# Patient Record
Sex: FEMALE | Race: WHITE | Employment: PART TIME | URBAN - METROPOLITAN AREA
[De-identification: names, ages, dates, MRNs, and addresses within clinical notes are randomized per-mention and may not be internally consistent; named-entity substitution may affect disease eponyms.]

---

## 2017-05-31 ENCOUNTER — TELEPHONE (OUTPATIENT)
Dept: NEUROLOGY | Facility: CLINIC | Age: 52
End: 2017-05-31

## 2017-05-31 DIAGNOSIS — G40.209 PARTIAL SYMPTOMATIC EPILEPSY WITH COMPLEX PARTIAL SEIZURES, NOT INTRACTABLE, WITHOUT STATUS EPILEPTICUS (HCC): Primary | ICD-10-CM

## 2017-06-01 NOTE — TELEPHONE ENCOUNTER
Received fax from [employer]    Keppra 750mg tablet approved from 5/31/17 - 05/31/18    Case HR#[id number]

## 2017-06-08 ENCOUNTER — OFFICE VISIT (OUTPATIENT)
Dept: NEUROLOGY | Facility: CLINIC | Age: 52
End: 2017-06-08

## 2017-06-08 ENCOUNTER — APPOINTMENT (OUTPATIENT)
Dept: LAB | Age: 52
End: 2017-06-08
Attending: Other
Payer: COMMERCIAL

## 2017-06-08 VITALS
SYSTOLIC BLOOD PRESSURE: 126 MMHG | HEART RATE: 82 BPM | WEIGHT: 126 LBS | RESPIRATION RATE: 16 BRPM | HEIGHT: 61 IN | BODY MASS INDEX: 23.79 KG/M2 | DIASTOLIC BLOOD PRESSURE: 72 MMHG

## 2017-06-08 DIAGNOSIS — G40.209 PARTIAL SYMPTOMATIC EPILEPSY WITH COMPLEX PARTIAL SEIZURES, NOT INTRACTABLE, WITHOUT STATUS EPILEPTICUS (HCC): Primary | ICD-10-CM

## 2017-06-08 DIAGNOSIS — E04.1 THYROID NODULE: ICD-10-CM

## 2017-06-08 DIAGNOSIS — Q85.00 NEUROFIBROMATOSIS (HCC): ICD-10-CM

## 2017-06-08 DIAGNOSIS — G40.209 PARTIAL SYMPTOMATIC EPILEPSY WITH COMPLEX PARTIAL SEIZURES, NOT INTRACTABLE, WITHOUT STATUS EPILEPTICUS (HCC): ICD-10-CM

## 2017-06-08 PROCEDURE — 99213 OFFICE O/P EST LOW 20 MIN: CPT | Performed by: OTHER

## 2017-06-08 PROCEDURE — 36415 COLL VENOUS BLD VENIPUNCTURE: CPT | Performed by: OTHER

## 2017-06-08 PROCEDURE — 80177 DRUG SCRN QUAN LEVETIRACETAM: CPT | Performed by: OTHER

## 2017-06-08 NOTE — PATIENT INSTRUCTIONS
Refill policies:    • Allow 2-3 business days for refills; controlled substances may take longer.   • Contact your pharmacy at least 5 days prior to running out of medication and have them send an electronic request or submit request through the [facility] have a procedure or additional testing performed. [facility]) will contact your insurance carrier to obtain pre-certification or prior authorization.     Unfortunately, SHAKIR has seen an increase in denial of payment even though the p

## 2017-06-08 NOTE — PROGRESS NOTES
[facility] with [facility]  Jadyn Saravia  8/10/1965  Primary Care Provider:  Romina Mcbride MD, [place]    6/8/2017    45year old yo patient being seen for:  Neurofibromatosis and seizures    Patient W Differential W Platelet  Standing Status: Future  Standing Expiration Date: 9/0/3530  Comp Metabolic Panel (14)  Standing Status: Future  Standing Expiration Date: 6/8/2018  Lipid Panel  Standing Status: Future  Standing Expiration Date: 6/8/2018  Daina Trimble

## 2017-06-20 ENCOUNTER — APPOINTMENT (OUTPATIENT)
Dept: LAB | Facility: HOSPITAL | Age: 52
End: 2017-06-20
Attending: OTOLARYNGOLOGY
Payer: COMMERCIAL

## 2017-06-20 ENCOUNTER — HOSPITAL ENCOUNTER (OUTPATIENT)
Dept: ULTRASOUND IMAGING | Facility: HOSPITAL | Age: 52
Discharge: HOME | End: 2017-06-20
Attending: OTOLARYNGOLOGY
Payer: COMMERCIAL

## 2017-06-20 DIAGNOSIS — E07.9 THYROID DYSFUNCTION: ICD-10-CM

## 2017-06-20 PROCEDURE — 84443 ASSAY THYROID STIM HORMONE: CPT

## 2017-06-20 PROCEDURE — 36415 COLL VENOUS BLD VENIPUNCTURE: CPT

## 2017-06-20 PROCEDURE — 84439 ASSAY OF FREE THYROXINE: CPT

## 2017-06-20 PROCEDURE — 76536 US EXAM OF HEAD AND NECK: CPT | Performed by: OTOLARYNGOLOGY

## 2017-07-10 ENCOUNTER — PATIENT MESSAGE (OUTPATIENT)
Dept: NEUROLOGY | Facility: CLINIC | Age: 52
End: 2017-07-10

## 2017-07-10 DIAGNOSIS — G40.209 PARTIAL SYMPTOMATIC EPILEPSY WITH COMPLEX PARTIAL SEIZURES, NOT INTRACTABLE, WITHOUT STATUS EPILEPTICUS (HCC): ICD-10-CM

## 2017-07-10 RX ORDER — LEVETIRACETAM 750 MG/1
750 TABLET ORAL 2 TIMES DAILY
Qty: 180 TABLET | Refills: 2 | Status: SHIPPED | OUTPATIENT
Start: 2017-07-10 | End: 2018-04-10

## 2017-07-10 NOTE — TELEPHONE ENCOUNTER
From: Bo Whitley  To:  Alia Stoll MD  Sent: 7/10/2017 10:12 AM CDT  Subject: Prescription Question    Dr. Shweta Duran,  Since we are going to try the generic Keppra I would like to have the prescription ordered now rather than wait until I would

## 2017-07-10 NOTE — TELEPHONE ENCOUNTER
Medication: Keppra (will authorize generic)    Date of last refill: 10/13/16  [place]    Last office visit: 6/8/17  Due back to clinic per last office note:  1 year  Date next office visit scheduled:  Future Appointme

## 2017-07-21 ENCOUNTER — OFFICE VISIT (OUTPATIENT)
Dept: INTERNAL MEDICINE CLINIC | Facility: CLINIC | Age: 52
End: 2017-07-21

## 2017-07-21 VITALS
HEIGHT: 60 IN | WEIGHT: 127.81 LBS | BODY MASS INDEX: 25.09 KG/M2 | RESPIRATION RATE: 16 BRPM | SYSTOLIC BLOOD PRESSURE: 120 MMHG | HEART RATE: 94 BPM | OXYGEN SATURATION: 99 % | DIASTOLIC BLOOD PRESSURE: 76 MMHG | TEMPERATURE: 98 F

## 2017-07-21 DIAGNOSIS — Z00.00 ROUTINE GENERAL MEDICAL EXAMINATION AT A HEALTH CARE FACILITY: Primary | ICD-10-CM

## 2017-07-21 DIAGNOSIS — Z12.4 PAP SMEAR FOR CERVICAL CANCER SCREENING: ICD-10-CM

## 2017-07-21 DIAGNOSIS — Z12.11 COLON CANCER SCREENING: ICD-10-CM

## 2017-07-21 DIAGNOSIS — Z23 NEED FOR VACCINATION: ICD-10-CM

## 2017-07-21 DIAGNOSIS — Z12.31 ENCOUNTER FOR SCREENING MAMMOGRAM FOR BREAST CANCER: ICD-10-CM

## 2017-07-21 DIAGNOSIS — Z78.0 POSTMENOPAUSAL: ICD-10-CM

## 2017-07-21 PROBLEM — E04.2 MULTIPLE THYROID NODULES: Status: ACTIVE | Noted: 2017-07-21

## 2017-07-21 PROBLEM — D12.5 ADENOMATOUS POLYP OF SIGMOID COLON: Status: ACTIVE | Noted: 2017-07-21

## 2017-07-21 PROBLEM — M85.88 OSTEOPENIA OF SPINE: Status: ACTIVE | Noted: 2017-07-21

## 2017-07-21 PROCEDURE — 88175 CYTOPATH C/V AUTO FLUID REDO: CPT | Performed by: INTERNAL MEDICINE

## 2017-07-21 PROCEDURE — 99386 PREV VISIT NEW AGE 40-64: CPT | Performed by: INTERNAL MEDICINE

## 2017-07-21 PROCEDURE — 87624 HPV HI-RISK TYP POOLED RSLT: CPT | Performed by: INTERNAL MEDICINE

## 2017-07-21 NOTE — PROGRESS NOTES
HPI:   Erinn Bolden is a 45year old female who presents for a complete physical exam.has epilepsy and thyroid nodules    Wt Readings from Last 6 Encounters:  07/21/17 : 127 lb 12.8 oz  06/08/17 : 126 lb  06/16/16 : 120 lb  06/11/15 : 115 lb  06/19/14 : skin tags  EYES:denies blurred vision or double vision  HEENT: denies nasal congestion, sinus pain or ST  LUNGS: denies shortness of breath with exertion  CARDIOVASCULAR: denies chest pain on exertion  GI: denies abdominal pain,denies heartburn, change in 5.500 mIU/mL         ASSESSMENT AND PLAN:   Donell Hendrickson is a 45year old female who presents for a complete physical exam.  Pap and pelvic done. Order put in for mammogram and dexascan.   Appropriate lab testing ordered, instructions given for healthy li

## 2017-07-24 ENCOUNTER — TELEPHONE (OUTPATIENT)
Dept: INTERNAL MEDICINE CLINIC | Facility: CLINIC | Age: 52
End: 2017-07-24

## 2017-07-24 ENCOUNTER — LAB ENCOUNTER (OUTPATIENT)
Dept: LAB | Age: 52
End: 2017-07-24
Attending: INTERNAL MEDICINE
Payer: COMMERCIAL

## 2017-07-24 DIAGNOSIS — Z00.00 ROUTINE GENERAL MEDICAL EXAMINATION AT A HEALTH CARE FACILITY: ICD-10-CM

## 2017-07-24 LAB
ALBUMIN SERPL-MCNC: 4 G/DL (ref 3.5–4.8)
ALP LIVER SERPL-CCNC: 45 U/L (ref 41–108)
ALT SERPL-CCNC: 18 U/L (ref 14–54)
AST SERPL-CCNC: 17 U/L (ref 15–41)
BASOPHILS # BLD AUTO: 0.11 X10(3) UL (ref 0–0.1)
BASOPHILS NFR BLD AUTO: 1.4 %
BILIRUB SERPL-MCNC: 0.6 MG/DL (ref 0.1–2)
BUN BLD-MCNC: 14 MG/DL (ref 8–20)
CALCIUM BLD-MCNC: 9.1 MG/DL (ref 8.3–10.3)
CHLORIDE: 108 MMOL/L (ref 101–111)
CHOLEST SMN-MCNC: 208 MG/DL (ref ?–200)
CO2: 27 MMOL/L (ref 22–32)
CREAT BLD-MCNC: 0.9 MG/DL (ref 0.55–1.02)
EOSINOPHIL # BLD AUTO: 0.2 X10(3) UL (ref 0–0.3)
EOSINOPHIL NFR BLD AUTO: 2.5 %
ERYTHROCYTE [DISTWIDTH] IN BLOOD BY AUTOMATED COUNT: 12.7 % (ref 11.5–16)
GLUCOSE BLD-MCNC: 90 MG/DL (ref 70–99)
HCT VFR BLD AUTO: 46.9 % (ref 34–50)
HDLC SERPL-MCNC: 78 MG/DL (ref 45–?)
HDLC SERPL: 2.67 {RATIO} (ref ?–4.44)
HGB BLD-MCNC: 15.1 G/DL (ref 12–16)
IMMATURE GRANULOCYTE COUNT: 0.02 X10(3) UL (ref 0–1)
IMMATURE GRANULOCYTE RATIO %: 0.3 %
LDLC SERPL CALC-MCNC: 110 MG/DL (ref ?–130)
LDLC SERPL-MCNC: 20 MG/DL (ref 5–40)
LYMPHOCYTES # BLD AUTO: 2.08 X10(3) UL (ref 0.9–4)
LYMPHOCYTES NFR BLD AUTO: 26.2 %
M PROTEIN MFR SERPL ELPH: 8 G/DL (ref 6.1–8.3)
MCH RBC QN AUTO: 28.9 PG (ref 27–33.2)
MCHC RBC AUTO-ENTMCNC: 32.2 G/DL (ref 31–37)
MCV RBC AUTO: 89.8 FL (ref 81–100)
MONOCYTES # BLD AUTO: 0.87 X10(3) UL (ref 0.1–0.6)
MONOCYTES NFR BLD AUTO: 11 %
NEUTROPHIL ABS PRELIM: 4.65 X10 (3) UL (ref 1.3–6.7)
NEUTROPHILS # BLD AUTO: 4.65 X10(3) UL (ref 1.3–6.7)
NEUTROPHILS NFR BLD AUTO: 58.6 %
NONHDLC SERPL-MCNC: 130 MG/DL (ref ?–130)
PLATELET # BLD AUTO: 308 10(3)UL (ref 150–450)
POTASSIUM SERPL-SCNC: 4.8 MMOL/L (ref 3.6–5.1)
RBC # BLD AUTO: 5.22 X10(6)UL (ref 3.8–5.1)
RED CELL DISTRIBUTION WIDTH-SD: 41.5 FL (ref 35.1–46.3)
SODIUM SERPL-SCNC: 143 MMOL/L (ref 136–144)
TRIGLYCERIDES: 99 MG/DL (ref ?–150)
WBC # BLD AUTO: 7.9 X10(3) UL (ref 4–13)

## 2017-07-24 PROCEDURE — 80061 LIPID PANEL: CPT | Performed by: INTERNAL MEDICINE

## 2017-07-24 PROCEDURE — 85025 COMPLETE CBC W/AUTO DIFF WBC: CPT | Performed by: INTERNAL MEDICINE

## 2017-07-24 PROCEDURE — 80053 COMPREHEN METABOLIC PANEL: CPT | Performed by: INTERNAL MEDICINE

## 2017-07-24 PROCEDURE — 36415 COLL VENOUS BLD VENIPUNCTURE: CPT | Performed by: INTERNAL MEDICINE

## 2017-07-24 NOTE — TELEPHONE ENCOUNTER
Pt dropped off lab results in our office, ordered by previous pcp. Included  CBC from 7/26/16 with normal results; also Lipid panel from  8/16/14 with normal results. To consult folder.

## 2017-07-24 NOTE — TELEPHONE ENCOUNTER
Incoming (mail or fax): Fax  Received from:  Patient dropped off in office (from patients other mychart)  Documentation given to:  Placed in Dr. Rhonda Galvan test results folder in Triage.

## 2017-07-26 LAB — HPV I/H RISK 1 DNA SPEC QL NAA+PROBE: NEGATIVE

## 2017-07-27 PROCEDURE — 82306 VITAMIN D 25 HYDROXY: CPT | Performed by: INTERNAL MEDICINE

## 2017-07-27 PROCEDURE — 36415 COLL VENOUS BLD VENIPUNCTURE: CPT | Performed by: INTERNAL MEDICINE

## 2017-07-28 ENCOUNTER — HOSPITAL ENCOUNTER (OUTPATIENT)
Dept: MAMMOGRAPHY | Age: 52
Discharge: HOME | End: 2017-07-28
Attending: INTERNAL MEDICINE
Payer: COMMERCIAL

## 2017-07-28 ENCOUNTER — HOSPITAL ENCOUNTER (OUTPATIENT)
Dept: BONE DENSITY | Age: 52
Discharge: HOME | End: 2017-07-28
Attending: INTERNAL MEDICINE
Payer: COMMERCIAL

## 2017-07-28 DIAGNOSIS — Z12.31 ENCOUNTER FOR SCREENING MAMMOGRAM FOR BREAST CANCER: ICD-10-CM

## 2017-07-28 DIAGNOSIS — M81.0 OSTEOPOROSIS, UNSPECIFIED OSTEOPOROSIS TYPE, UNSPECIFIED PATHOLOGICAL FRACTURE PRESENCE: Primary | ICD-10-CM

## 2017-07-28 DIAGNOSIS — Z78.0 POSTMENOPAUSAL: ICD-10-CM

## 2017-07-28 PROBLEM — M85.88 OSTEOPENIA OF SPINE: Status: RESOLVED | Noted: 2017-07-21 | Resolved: 2017-07-28

## 2017-07-28 LAB — 25-HYDROXYVITAMIN D (TOTAL): 27.1 NG/ML (ref 30–100)

## 2017-07-28 PROCEDURE — 77080 DXA BONE DENSITY AXIAL: CPT | Performed by: INTERNAL MEDICINE

## 2017-07-28 PROCEDURE — 77067 SCR MAMMO BI INCL CAD: CPT | Performed by: INTERNAL MEDICINE

## 2017-07-31 ENCOUNTER — OFFICE VISIT (OUTPATIENT)
Dept: INTERNAL MEDICINE CLINIC | Facility: CLINIC | Age: 52
End: 2017-07-31

## 2017-07-31 VITALS
BODY MASS INDEX: 24 KG/M2 | OXYGEN SATURATION: 99 % | DIASTOLIC BLOOD PRESSURE: 68 MMHG | SYSTOLIC BLOOD PRESSURE: 134 MMHG | TEMPERATURE: 98 F | HEART RATE: 84 BPM | RESPIRATION RATE: 16 BRPM | WEIGHT: 127.13 LBS | HEIGHT: 61 IN

## 2017-07-31 DIAGNOSIS — M81.0 AGE-RELATED OSTEOPOROSIS WITHOUT CURRENT PATHOLOGICAL FRACTURE: ICD-10-CM

## 2017-07-31 DIAGNOSIS — R79.89 LOW SERUM VITAMIN D: Primary | ICD-10-CM

## 2017-07-31 PROCEDURE — 99214 OFFICE O/P EST MOD 30 MIN: CPT | Performed by: INTERNAL MEDICINE

## 2017-07-31 RX ORDER — MULTIVIT-MIN/IRON/FOLIC ACID/K 18-600-40
1 CAPSULE ORAL DAILY
Qty: 30 CAPSULE | Refills: 0 | COMMUNITY
Start: 2017-07-31

## 2017-07-31 RX ORDER — ALENDRONATE SODIUM 70 MG/1
70 TABLET ORAL WEEKLY
Qty: 12 TABLET | Refills: 3 | Status: SHIPPED | OUTPATIENT
Start: 2017-07-31 | End: 2018-06-01

## 2017-07-31 NOTE — PROGRESS NOTES
Consuela Claude is a 45year old female  Patient presents with:  Test Results: DEXA Scan      HPI:   Osteoporosis vertebral and nonvertebral  Menopause at 54  keppra for 10 years, dilantin 10 years ago  Has thyroid nodules but never hyperthyroidism  No fhx GI: denies abdominal pain and denies heartburn, change in appetite or bm's  NEURO: denies headaches or dizziness    EXAM:   /68 (BP Location: Right arm, Patient Position: Sitting, Cuff Size: adult)   Pulse 84   Temp 98.1 °F (36.7 °C) (Oral)   Resp 16 RDW 12.7 11.5 - 16.0 %   RDW-SD 41.5 35.1 - 46.3 fL   Neutrophil Absolute Prelim 4.65 1.30 - 6.70 x10 (3) uL   Neutrophil Absolute 4.65 1.30 - 6.70 x10(3) uL   Lymphocyte Absolute 2.08 0.90 - 4.00 x10(3) uL   Monocyte Absolute 0.87 (H) 0.10 - 0.60 x10(3) u · At least 30 minutes to one hour before any food, drink, or other medications. · While sitting or standing. You should not lie down for at least 30 minutes after taking the medicine. Newer medicines can be taken weekly or monthly.  Talk with your doctor · Use proper techniques if you need to lift heavy objects. · Do back exercises to help your posture. · Lie on your back when you have pain. · Ask your healthcare provider about these and other ways to help your spine.   Date Last Reviewed: 10/17/2015  © © 0822-8622 The [address] [place] [address], [address] [place] [address]. All rights reserved. This information is not intended as a substitute for professional medical care. Always follow your healthcare professional's instructions.         Prevent © 0132-0924 [address], [address]. All rights reserved. This information is not intended as a substitute for professional medical care. Always follow your healthcare professional's instructions.         What Is In later years, both men and women need to take extra care of their bones. By this point, the body loses more bone than it makes. If too much bone is lost, you may be at risk for fractures. With age, the quality and quantity of bone declines.  You can lesse © 8815-9569 [address], [address]. All rights reserved. This information is not intended as a substitute for professional medical care. Always follow your healthcare professional's instructions.         Lionel Sullivan The patient indicates understanding of these issues and agrees to the plan.

## 2017-07-31 NOTE — PATIENT INSTRUCTIONS
Osteoporosis Medications: Bisphosphonates  Depending on your needs, your healthcare provider may prescribe medicines to prevent or treat osteoporosis. Bisphosphonates  Several medicines make up the class of drugs known as bisphosphonates.  Bisphosphona The most common fracture sites in people with osteoporosis are the wrist, spine, and hip. These fractures are often caused by accidents and falls. All fractures are painful and may limit what you can do. But hip fractures are very serious.  They need surger If you have osteoporosis, exercise is vital for your health. It can prevent bone fractures and spine changes. It will slow bone loss. Exercise will strengthen your body. It can also be fun.  A variety of exercises is best. See below for exercises that can h Dairy Fish & beans Other sources      Source   Calcium (mg) per serving   Source   Calcium (mg) per serving   Source   Calcium (mg) per serving      Low-fat yogurt, plain   415 mg/8 oz.   Sardines, Atlantic, canned, with bones   351 mg/3 oz.   Oatmeal, ins Kamila Crate adulthood to age 27  During young adulthood, bones become their strongest. This is called peak bone mass. The same good habits that kept bones healthy in childhood help keep bone healthy in adulthood.   Age 27 to menopause  Bone mass declines slightly · Alcohol is toxic to bones. It is a major cause of bone loss. Heavy drinking can cause osteoporosis even if you have no other risk factors. · Smoking reduces bone mass. Smoking may also interfere with estrogen levels and cause early menopause.   · Inactiv Changes in hormone levels, activity, medications, or diet can affect the bone-making system. When the system gets out of balance, the amount of bone lost is greater than the amount of bone made.  This can cause osteopenia (when bone starts to become less de

## 2017-08-10 ENCOUNTER — HOSPITAL ENCOUNTER (OUTPATIENT)
Dept: ULTRASOUND IMAGING | Facility: HOSPITAL | Age: 52
Discharge: HOME | End: 2017-08-10
Attending: OTOLARYNGOLOGY
Payer: COMMERCIAL

## 2017-08-10 DIAGNOSIS — E04.1 THYROID NODULE: ICD-10-CM

## 2017-08-10 PROCEDURE — 88173 CYTOPATH EVAL FNA REPORT: CPT | Performed by: OTOLARYNGOLOGY

## 2017-08-10 PROCEDURE — 10022 US FNA THYROID SH(CPT=10022/76942): CPT | Performed by: OTOLARYNGOLOGY

## 2017-08-10 PROCEDURE — 76942 ECHO GUIDE FOR BIOPSY: CPT | Performed by: OTOLARYNGOLOGY

## 2017-08-10 NOTE — PROCEDURES
PROCEDURE: US FNA THYROID (CPT=10022/24506)    COMPARISON: JORDIN , US THYROID (SNI=[id number]), 6/20/2017, 9:11.     INDICATIONS: E04.1 Nontoxic single thyroid nodule    DESCRIPTION: The risks, benefits, and alternatives of the procedure were explained to the p

## 2017-10-01 ENCOUNTER — PATIENT MESSAGE (OUTPATIENT)
Dept: NEUROLOGY | Facility: CLINIC | Age: 52
End: 2017-10-01

## 2017-10-01 DIAGNOSIS — G40.209 PARTIAL SYMPTOMATIC EPILEPSY WITH COMPLEX PARTIAL SEIZURES, NOT INTRACTABLE, WITHOUT STATUS EPILEPTICUS (HCC): Primary | ICD-10-CM

## 2017-10-02 NOTE — TELEPHONE ENCOUNTER
See TE from 07/10/17. Placed lab orders. Informed patient to have labs drawn 2 weeks from start date; see [employer] message.

## 2017-10-02 NOTE — TELEPHONE ENCOUNTER
From: Fallon Whitley  To: Dick Hopkins MD  Sent: 10/1/2017 5:03 PM CDT  Subject: Visit Follow-up Question    Dear Dr. Camille Villasenor,  I will begin taking the generic Keppra on Saturday, Oct. 7.  How long will I need to take the generic before having the

## 2017-10-23 ENCOUNTER — APPOINTMENT (OUTPATIENT)
Dept: LAB | Age: 52
End: 2017-10-23
Attending: Other
Payer: COMMERCIAL

## 2017-10-23 DIAGNOSIS — G40.209 PARTIAL SYMPTOMATIC EPILEPSY WITH COMPLEX PARTIAL SEIZURES, NOT INTRACTABLE, WITHOUT STATUS EPILEPTICUS (HCC): ICD-10-CM

## 2017-10-23 DIAGNOSIS — R79.89 LOW SERUM VITAMIN D: ICD-10-CM

## 2017-10-23 PROCEDURE — 80177 DRUG SCRN QUAN LEVETIRACETAM: CPT

## 2017-10-23 PROCEDURE — 82306 VITAMIN D 25 HYDROXY: CPT

## 2017-10-23 PROCEDURE — 36415 COLL VENOUS BLD VENIPUNCTURE: CPT

## 2018-04-10 DIAGNOSIS — G40.209 PARTIAL SYMPTOMATIC EPILEPSY WITH COMPLEX PARTIAL SEIZURES, NOT INTRACTABLE, WITHOUT STATUS EPILEPTICUS (HCC): ICD-10-CM

## 2018-04-10 RX ORDER — LEVETIRACETAM 750 MG/1
750 TABLET ORAL 2 TIMES DAILY
Qty: 180 TABLET | Refills: 3 | Status: SHIPPED | OUTPATIENT
Start: 2018-04-10 | End: 2019-03-01

## 2018-04-10 NOTE — TELEPHONE ENCOUNTER
Medication: keppra    Date of last refill: 7/10/17  [place]    Last office visit: 6/8/17  Due back to clinic per last office note:  1 year  Date next office visit scheduled:  Future Appointments  Date Time Provider Manoj Germain

## 2018-05-30 RX ORDER — ALENDRONATE SODIUM 70 MG/1
TABLET ORAL
Qty: 12 TABLET | Refills: 0 | OUTPATIENT
Start: 2018-05-30

## 2018-06-01 ENCOUNTER — PATIENT MESSAGE (OUTPATIENT)
Dept: INTERNAL MEDICINE CLINIC | Facility: CLINIC | Age: 53
End: 2018-06-01

## 2018-06-01 RX ORDER — ALENDRONATE SODIUM 70 MG/1
70 TABLET ORAL WEEKLY
Qty: 12 TABLET | Refills: 0 | Status: SHIPPED | OUTPATIENT
Start: 2018-06-01 | End: 2018-08-28

## 2018-06-01 NOTE — TELEPHONE ENCOUNTER
From: Cara Aragon  To: Otilio Allison MD  Sent: 6/1/2018 2:22 PM CDT  Subject: Prescription Question    Dear Joslyn Walker,  Thank you for renewing the prescription. I may have renewed too early.  I wanted you to be aware that I only have enough medi

## 2018-06-01 NOTE — TELEPHONE ENCOUNTER
48 wk supply alendronate ordered at annual office visit (12wks + 3 refills) but will be out before next annual visit.      Last OV relevant to medication: 7/31/17  Last refill date: 7/31/17     #/refills: 12+3 (48 wks)  When pt was asked to return for OV: 1

## 2018-06-01 NOTE — TELEPHONE ENCOUNTER
From: Graham Mahajan  To: Sera Lynne MD  Sent: 6/1/2018 9:21 AM CDT  Subject: Prescription Question    Dr. Ny Kimball,  I received notice that the renewal for the prescription alendronate was denied.  As I recall you told me when you prescri

## 2018-06-04 ENCOUNTER — OFFICE VISIT (OUTPATIENT)
Dept: NEUROLOGY | Facility: CLINIC | Age: 53
End: 2018-06-04

## 2018-06-04 VITALS
RESPIRATION RATE: 16 BRPM | BODY MASS INDEX: 23.98 KG/M2 | SYSTOLIC BLOOD PRESSURE: 127 MMHG | DIASTOLIC BLOOD PRESSURE: 66 MMHG | HEIGHT: 61 IN | HEART RATE: 88 BPM | WEIGHT: 127 LBS

## 2018-06-04 DIAGNOSIS — G40.209 PARTIAL SYMPTOMATIC EPILEPSY WITH COMPLEX PARTIAL SEIZURES, NOT INTRACTABLE, WITHOUT STATUS EPILEPTICUS (HCC): ICD-10-CM

## 2018-06-04 DIAGNOSIS — Q85.00 NEUROFIBROMATOSIS (HCC): Primary | ICD-10-CM

## 2018-06-04 PROCEDURE — 99213 OFFICE O/P EST LOW 20 MIN: CPT | Performed by: OTHER

## 2018-06-04 NOTE — PATIENT INSTRUCTIONS
Refill policies:    • Allow 2-3 business days for refills; controlled substances may take longer.   • Contact your pharmacy at least 5 days prior to running out of medication and have them send an electronic request or submit request through the “request re entire amount billed. Precertification and Prior Authorizations: If your physician has recommended that you have a procedure or additional testing performed.   [facility]) will contact your insurance carrier to obtain pre-certi

## 2018-06-04 NOTE — PROGRESS NOTES
[facility] with [facility]  Chaka Bellamy  8/10/1965  Primary Care Provider:  Anjana Davis MD    6/4/2018    47year old yo patient being seen for:  Neurofibromatosis and seizures    N ([email or web address]  (primary encounter diagnosis)  Partial symptomatic epilepsy with complex partial seizures, not intractable, without status epilepticus ([facility]    Discussion on the case:  Stable and same medication    ( ) Active problems: ongoing and still needing att

## 2018-06-18 ENCOUNTER — HOSPITAL ENCOUNTER (OUTPATIENT)
Dept: ULTRASOUND IMAGING | Facility: HOSPITAL | Age: 53
Discharge: HOME | End: 2018-06-18
Attending: OTOLARYNGOLOGY
Payer: COMMERCIAL

## 2018-06-18 DIAGNOSIS — E04.1 THYROID NODULE: ICD-10-CM

## 2018-06-18 PROCEDURE — 76536 US EXAM OF HEAD AND NECK: CPT | Performed by: OTOLARYNGOLOGY

## 2018-06-19 NOTE — PROGRESS NOTES
Please inform us thyroid showing left lower thyroid nodule with calcification has increased in size, recommend FNA to left lower pole 1.1cm nodule and follow up after FNA with Dr Nanda Balbuena

## 2018-06-19 NOTE — PROGRESS NOTES
Informed pt of results, placed order for FNA, gave pt info to schedule FNA, confirmed f/u appt scheduled with JALEESA on 7/2/18 @1pm, pt v/u

## 2018-06-25 ENCOUNTER — OFFICE VISIT (OUTPATIENT)
Dept: INTERNAL MEDICINE CLINIC | Facility: CLINIC | Age: 53
End: 2018-06-25

## 2018-06-25 VITALS
RESPIRATION RATE: 14 BRPM | OXYGEN SATURATION: 98 % | HEART RATE: 96 BPM | DIASTOLIC BLOOD PRESSURE: 76 MMHG | BODY MASS INDEX: 25.02 KG/M2 | TEMPERATURE: 98 F | WEIGHT: 129.13 LBS | HEIGHT: 60.32 IN | SYSTOLIC BLOOD PRESSURE: 104 MMHG

## 2018-06-25 DIAGNOSIS — M81.0 AGE-RELATED OSTEOPOROSIS WITHOUT CURRENT PATHOLOGICAL FRACTURE: ICD-10-CM

## 2018-06-25 DIAGNOSIS — Z12.31 ENCOUNTER FOR SCREENING MAMMOGRAM FOR BREAST CANCER: Primary | ICD-10-CM

## 2018-06-25 DIAGNOSIS — Z00.00 ROUTINE GENERAL MEDICAL EXAMINATION AT A HEALTH CARE FACILITY: ICD-10-CM

## 2018-06-25 PROCEDURE — 99396 PREV VISIT EST AGE 40-64: CPT | Performed by: INTERNAL MEDICINE

## 2018-06-25 NOTE — PROGRESS NOTES
HPI:   Chaka Bellamy is a 47year old female who presents for a complete physical exam.has epilepsy and thyroid nodules and OP  She sees ENT for thyroid nodules  She is tolerating fosamax well    Wt Readings from Last 6 Encounters:  06/25/18 : 129 lb 1.6 occasion     REVIEW OF SYSTEMS:   GENERAL: feels well otherwise  SKIN: has neurofibromas and skin tags  EYES:denies blurred vision or double vision  HEENT: denies nasal congestion, sinus pain or ST  LUNGS: denies shortness of breath with exertion  CARDIOVA old female who presents for a complete physical exam.breast  and pelvic done. Order put in for mammogram and dexascan. Appropriate lab testing ordered, instructions given for healthy living as indicated.  The patient indicates understanding of these issues

## 2018-06-27 ENCOUNTER — LABORATORY ENCOUNTER (OUTPATIENT)
Dept: LAB | Age: 53
End: 2018-06-27
Attending: INTERNAL MEDICINE
Payer: COMMERCIAL

## 2018-06-27 DIAGNOSIS — Z00.00 ROUTINE GENERAL MEDICAL EXAMINATION AT A HEALTH CARE FACILITY: ICD-10-CM

## 2018-06-27 PROCEDURE — 80061 LIPID PANEL: CPT | Performed by: INTERNAL MEDICINE

## 2018-06-27 PROCEDURE — 82306 VITAMIN D 25 HYDROXY: CPT | Performed by: INTERNAL MEDICINE

## 2018-06-27 PROCEDURE — 36415 COLL VENOUS BLD VENIPUNCTURE: CPT | Performed by: INTERNAL MEDICINE

## 2018-06-27 PROCEDURE — 80050 GENERAL HEALTH PANEL: CPT | Performed by: INTERNAL MEDICINE

## 2018-06-29 ENCOUNTER — HOSPITAL ENCOUNTER (OUTPATIENT)
Dept: ULTRASOUND IMAGING | Facility: HOSPITAL | Age: 53
Discharge: HOME | End: 2018-06-29
Attending: OTOLARYNGOLOGY
Payer: COMMERCIAL

## 2018-06-29 DIAGNOSIS — E04.1 THYROID NODULE: ICD-10-CM

## 2018-06-29 PROCEDURE — 88173 CYTOPATH EVAL FNA REPORT: CPT | Performed by: OTOLARYNGOLOGY

## 2018-06-29 PROCEDURE — 10022 US FNA THYROID (CPT=10022/76942): CPT | Performed by: OTOLARYNGOLOGY

## 2018-06-29 PROCEDURE — 76942 ECHO GUIDE FOR BIOPSY: CPT | Performed by: OTOLARYNGOLOGY

## 2018-07-02 ENCOUNTER — HOSPITAL ENCOUNTER (OUTPATIENT)
Dept: MAMMOGRAPHY | Age: 53
Discharge: HOME | End: 2018-07-02
Attending: INTERNAL MEDICINE
Payer: COMMERCIAL

## 2018-07-02 DIAGNOSIS — Z12.31 ENCOUNTER FOR SCREENING MAMMOGRAM FOR BREAST CANCER: ICD-10-CM

## 2018-07-02 PROCEDURE — 77063 BREAST TOMOSYNTHESIS BI: CPT | Performed by: INTERNAL MEDICINE

## 2018-07-02 PROCEDURE — 77067 SCR MAMMO BI INCL CAD: CPT | Performed by: INTERNAL MEDICINE

## 2018-08-27 ENCOUNTER — PATIENT MESSAGE (OUTPATIENT)
Dept: INTERNAL MEDICINE CLINIC | Facility: CLINIC | Age: 53
End: 2018-08-27

## 2018-08-27 RX ORDER — ALENDRONATE SODIUM 70 MG/1
70 TABLET ORAL WEEKLY
Qty: 12 TABLET | Refills: 0 | Status: CANCELLED
Start: 2018-08-27

## 2018-08-28 RX ORDER — ALENDRONATE SODIUM 70 MG/1
70 TABLET ORAL WEEKLY
Qty: 50 TABLET | Refills: 0 | Status: SHIPPED | OUTPATIENT
Start: 2018-08-28 | End: 2019-03-14

## 2018-08-28 NOTE — TELEPHONE ENCOUNTER
From: Tammy Sandoval  Sent: 8/27/2018 5:34 PM CDT  Subject: Medication Renewal Request    Dina Whitley would like a refill of the following medications:     Alendronate Sodium 70 MG Oral Tab Raina Jackson MD]   Patient Comment: I would appreciate it if this prescription would be renewed for a year.     Preferred pharmacy: [address], [phone], [phone]

## 2018-08-28 NOTE — TELEPHONE ENCOUNTER
From: Gabo Leija  To: Karin Whitfield MD  Sent: 8/27/2018 5:39 PM CDT  Subject: Prescription Question    My pharmacy called me a week ago letting me know that I needed to refill my prescription for Alendronate.  I just asked for a refill through

## 2019-03-01 ENCOUNTER — TELEPHONE (OUTPATIENT)
Dept: NEUROLOGY | Facility: CLINIC | Age: 54
End: 2019-03-01

## 2019-03-01 DIAGNOSIS — G40.209 PARTIAL SYMPTOMATIC EPILEPSY WITH COMPLEX PARTIAL SEIZURES, NOT INTRACTABLE, WITHOUT STATUS EPILEPTICUS (HCC): ICD-10-CM

## 2019-03-01 RX ORDER — LEVETIRACETAM 750 MG/1
750 TABLET ORAL 2 TIMES DAILY
Qty: 180 TABLET | Refills: 3 | Status: SHIPPED | OUTPATIENT
Start: 2019-03-01 | End: 2020-02-04

## 2019-03-01 NOTE — TELEPHONE ENCOUNTER
Medication: Keppra    Date of last refill: 4/10/18  [place]    Last office visit: 6/4/18  Due back to clinic per last office note:  yearly  Date next office visit scheduled:    Future Appointments   Date Time Provider

## 2019-03-14 RX ORDER — ALENDRONATE SODIUM 70 MG/1
TABLET ORAL
Qty: 12 TABLET | Refills: 0 | Status: SHIPPED | OUTPATIENT
Start: 2019-03-14 | End: 2019-06-27

## 2019-06-07 ENCOUNTER — OFFICE VISIT (OUTPATIENT)
Dept: NEUROLOGY | Facility: CLINIC | Age: 54
End: 2019-06-07
Payer: COMMERCIAL

## 2019-06-07 VITALS
RESPIRATION RATE: 16 BRPM | HEIGHT: 60.32 IN | OXYGEN SATURATION: 98 % | HEART RATE: 80 BPM | SYSTOLIC BLOOD PRESSURE: 115 MMHG | BODY MASS INDEX: 25 KG/M2 | DIASTOLIC BLOOD PRESSURE: 70 MMHG

## 2019-06-07 DIAGNOSIS — G40.209 PARTIAL SYMPTOMATIC EPILEPSY WITH COMPLEX PARTIAL SEIZURES, NOT INTRACTABLE, WITHOUT STATUS EPILEPTICUS (HCC): ICD-10-CM

## 2019-06-07 DIAGNOSIS — Q85.00 NEUROFIBROMATOSIS (HCC): Primary | ICD-10-CM

## 2019-06-07 PROCEDURE — 99213 OFFICE O/P EST LOW 20 MIN: CPT | Performed by: OTHER

## 2019-06-07 NOTE — PROGRESS NOTES
[facility] with [facility]  Jadyn Saravia  8/10/1965  Primary Care Provider:  Bhavin Mcdaniel MD    6/7/2019  Accompanied visit:      (x) No.        46year old yo patient being seen for: potential side effects of any treatment relevant to above. Includes explanation of tests as necessary. Return in about 1 year (around 6/7/2020).       Patient understands that if needed, based on condition and or test results, follow up will be readjust

## 2019-06-18 ENCOUNTER — HOSPITAL ENCOUNTER (OUTPATIENT)
Dept: ULTRASOUND IMAGING | Facility: HOSPITAL | Age: 54
Discharge: HOME | End: 2019-06-18
Attending: OTOLARYNGOLOGY
Payer: COMMERCIAL

## 2019-06-18 DIAGNOSIS — E04.1 THYROID NODULE: ICD-10-CM

## 2019-06-18 DIAGNOSIS — E07.9 THYROID DYSFUNCTION: ICD-10-CM

## 2019-06-18 PROCEDURE — 76536 US EXAM OF HEAD AND NECK: CPT | Performed by: OTOLARYNGOLOGY

## 2019-06-20 ENCOUNTER — PATIENT MESSAGE (OUTPATIENT)
Dept: INTERNAL MEDICINE CLINIC | Facility: CLINIC | Age: 54
End: 2019-06-20

## 2019-06-20 NOTE — TELEPHONE ENCOUNTER
From: Chaka Bellamy  To: Nathaniel Damon MD  Sent: 6/20/2019 10:51 AM CDT  Subject: Other    Dear Dr. Jose J Naqvi,    Next week is my scheduled annual physical. I would like to get the MMR booster vaccine.  I have contacted my insurance and was t

## 2019-06-27 ENCOUNTER — OFFICE VISIT (OUTPATIENT)
Dept: INTERNAL MEDICINE CLINIC | Facility: CLINIC | Age: 54
End: 2019-06-27
Payer: COMMERCIAL

## 2019-06-27 VITALS
SYSTOLIC BLOOD PRESSURE: 106 MMHG | WEIGHT: 125 LBS | HEART RATE: 77 BPM | RESPIRATION RATE: 14 BRPM | BODY MASS INDEX: 24.54 KG/M2 | DIASTOLIC BLOOD PRESSURE: 68 MMHG | HEIGHT: 60 IN | OXYGEN SATURATION: 99 % | TEMPERATURE: 99 F

## 2019-06-27 DIAGNOSIS — Z12.11 COLON CANCER SCREENING: ICD-10-CM

## 2019-06-27 DIAGNOSIS — Z00.00 ROUTINE GENERAL MEDICAL EXAMINATION AT A HEALTH CARE FACILITY: Primary | ICD-10-CM

## 2019-06-27 DIAGNOSIS — M81.0 AGE-RELATED OSTEOPOROSIS WITHOUT CURRENT PATHOLOGICAL FRACTURE: ICD-10-CM

## 2019-06-27 DIAGNOSIS — Z12.31 ENCOUNTER FOR SCREENING MAMMOGRAM FOR BREAST CANCER: ICD-10-CM

## 2019-06-27 PROCEDURE — 99396 PREV VISIT EST AGE 40-64: CPT | Performed by: INTERNAL MEDICINE

## 2019-06-27 RX ORDER — ALENDRONATE SODIUM 70 MG/1
TABLET ORAL
Qty: 12 TABLET | Refills: 3 | Status: SHIPPED | OUTPATIENT
Start: 2019-06-27 | End: 2020-07-20

## 2019-06-27 NOTE — PROGRESS NOTES
HPI:   Ed Orozco is a 46year old female who presents for a complete physical exam.has epilepsy and neurofibromatosis managed by neuro,  and thyroid nodules managed by ENT and OP managed by myself  She sees ENT for thyroid nodules  She is tolerating f lives w/parents .  Children: none, G0 , LMP: menopause 2009 , Exercise walks on occasion     REVIEW OF SYSTEMS:   GENERAL: feels well otherwise  SKIN: has neurofibromas and skin tags  EYES:denies blurred vision or double vision  HEENT: denies nasal congesti and dexascan. Appropriate lab testing ordered, instructions given for healthy living as indicated. The patient indicates understanding of these issues and agrees to the plan. The patient is asked to return for CPX in 1 year.     Routine general medical ex

## 2019-07-01 ENCOUNTER — LAB ENCOUNTER (OUTPATIENT)
Dept: LAB | Age: 54
End: 2019-07-01
Attending: INTERNAL MEDICINE
Payer: COMMERCIAL

## 2019-07-01 DIAGNOSIS — Z00.00 ROUTINE GENERAL MEDICAL EXAMINATION AT A HEALTH CARE FACILITY: ICD-10-CM

## 2019-07-01 LAB
ALBUMIN SERPL-MCNC: 3.9 G/DL (ref 3.4–5)
ALBUMIN/GLOB SERPL: 1.1 {RATIO} (ref 1–2)
ALP LIVER SERPL-CCNC: 35 U/L (ref 41–108)
ALT SERPL-CCNC: 22 U/L (ref 13–56)
ANION GAP SERPL CALC-SCNC: 7 MMOL/L (ref 0–18)
AST SERPL-CCNC: 16 U/L (ref 15–37)
BASOPHILS # BLD AUTO: 0.08 X10(3) UL (ref 0–0.2)
BASOPHILS NFR BLD AUTO: 0.9 %
BILIRUB SERPL-MCNC: 0.5 MG/DL (ref 0.1–2)
BUN BLD-MCNC: 13 MG/DL (ref 7–18)
BUN/CREAT SERPL: 15.1 (ref 10–20)
CALCIUM BLD-MCNC: 9.1 MG/DL (ref 8.5–10.1)
CHLORIDE SERPL-SCNC: 108 MMOL/L (ref 98–112)
CHOLEST SMN-MCNC: 169 MG/DL (ref ?–200)
CO2 SERPL-SCNC: 28 MMOL/L (ref 21–32)
CREAT BLD-MCNC: 0.86 MG/DL (ref 0.55–1.02)
DEPRECATED RDW RBC AUTO: 43.5 FL (ref 35.1–46.3)
EOSINOPHIL # BLD AUTO: 0.25 X10(3) UL (ref 0–0.7)
EOSINOPHIL NFR BLD AUTO: 2.7 %
ERYTHROCYTE [DISTWIDTH] IN BLOOD BY AUTOMATED COUNT: 13.1 % (ref 11–15)
GLOBULIN PLAS-MCNC: 3.6 G/DL (ref 2.8–4.4)
GLUCOSE BLD-MCNC: 92 MG/DL (ref 70–99)
HCT VFR BLD AUTO: 44 % (ref 35–48)
HDLC SERPL-MCNC: 70 MG/DL (ref 40–59)
HGB BLD-MCNC: 13.9 G/DL (ref 12–16)
IMM GRANULOCYTES # BLD AUTO: 0.02 X10(3) UL (ref 0–1)
IMM GRANULOCYTES NFR BLD: 0.2 %
LDLC SERPL CALC-MCNC: 78 MG/DL (ref ?–100)
LYMPHOCYTES # BLD AUTO: 1.46 X10(3) UL (ref 1–4)
LYMPHOCYTES NFR BLD AUTO: 16 %
M PROTEIN MFR SERPL ELPH: 7.5 G/DL (ref 6.4–8.2)
MCH RBC QN AUTO: 28.8 PG (ref 26–34)
MCHC RBC AUTO-ENTMCNC: 31.6 G/DL (ref 31–37)
MCV RBC AUTO: 91.1 FL (ref 80–100)
MONOCYTES # BLD AUTO: 1.01 X10(3) UL (ref 0.1–1)
MONOCYTES NFR BLD AUTO: 11.1 %
NEUTROPHILS # BLD AUTO: 6.29 X10 (3) UL (ref 1.5–7.7)
NEUTROPHILS # BLD AUTO: 6.29 X10(3) UL (ref 1.5–7.7)
NEUTROPHILS NFR BLD AUTO: 69.1 %
NONHDLC SERPL-MCNC: 99 MG/DL (ref ?–130)
OSMOLALITY SERPL CALC.SUM OF ELEC: 296 MOSM/KG (ref 275–295)
PLATELET # BLD AUTO: 288 10(3)UL (ref 150–450)
POTASSIUM SERPL-SCNC: 4.5 MMOL/L (ref 3.5–5.1)
RBC # BLD AUTO: 4.83 X10(6)UL (ref 3.8–5.3)
SODIUM SERPL-SCNC: 143 MMOL/L (ref 136–145)
TRIGL SERPL-MCNC: 105 MG/DL (ref 30–149)
TSI SER-ACNC: 2.6 MIU/ML (ref 0.36–3.74)
VIT D+METAB SERPL-MCNC: 60.5 NG/ML (ref 30–100)
VLDLC SERPL CALC-MCNC: 21 MG/DL (ref 0–30)
WBC # BLD AUTO: 9.1 X10(3) UL (ref 4–11)

## 2019-07-01 PROCEDURE — 36415 COLL VENOUS BLD VENIPUNCTURE: CPT | Performed by: INTERNAL MEDICINE

## 2019-07-01 PROCEDURE — 80050 GENERAL HEALTH PANEL: CPT | Performed by: INTERNAL MEDICINE

## 2019-07-01 PROCEDURE — 82306 VITAMIN D 25 HYDROXY: CPT | Performed by: INTERNAL MEDICINE

## 2019-07-01 PROCEDURE — 80061 LIPID PANEL: CPT | Performed by: INTERNAL MEDICINE

## 2019-07-05 ENCOUNTER — HOSPITAL ENCOUNTER (OUTPATIENT)
Dept: BONE DENSITY | Age: 54
Discharge: HOME | End: 2019-07-05
Attending: INTERNAL MEDICINE
Payer: COMMERCIAL

## 2019-07-05 ENCOUNTER — HOSPITAL ENCOUNTER (OUTPATIENT)
Dept: MAMMOGRAPHY | Age: 54
Discharge: HOME | End: 2019-07-05
Attending: INTERNAL MEDICINE
Payer: COMMERCIAL

## 2019-07-05 DIAGNOSIS — M81.0 AGE-RELATED OSTEOPOROSIS WITHOUT CURRENT PATHOLOGICAL FRACTURE: ICD-10-CM

## 2019-07-05 DIAGNOSIS — Z12.31 ENCOUNTER FOR SCREENING MAMMOGRAM FOR BREAST CANCER: ICD-10-CM

## 2019-07-05 PROCEDURE — 77080 DXA BONE DENSITY AXIAL: CPT | Performed by: INTERNAL MEDICINE

## 2019-07-05 PROCEDURE — 77063 BREAST TOMOSYNTHESIS BI: CPT | Performed by: INTERNAL MEDICINE

## 2019-07-05 PROCEDURE — 77067 SCR MAMMO BI INCL CAD: CPT | Performed by: INTERNAL MEDICINE

## 2019-12-05 PROBLEM — D12.3 BENIGN NEOPLASM OF TRANSVERSE COLON: Status: ACTIVE | Noted: 2019-12-05

## 2019-12-05 PROBLEM — Z86.010 PERSONAL HISTORY OF COLONIC POLYPS: Status: ACTIVE | Noted: 2019-12-05

## 2019-12-05 PROBLEM — Z86.0100 PERSONAL HISTORY OF COLONIC POLYPS: Status: ACTIVE | Noted: 2019-12-05

## 2020-02-04 DIAGNOSIS — G40.209 PARTIAL SYMPTOMATIC EPILEPSY WITH COMPLEX PARTIAL SEIZURES, NOT INTRACTABLE, WITHOUT STATUS EPILEPTICUS (HCC): ICD-10-CM

## 2020-02-04 NOTE — TELEPHONE ENCOUNTER
Medication: Levetiracetam 750 mg    Date of last refill:03/01/2019 with 3 addt refills      Last office visit: 06/07/2019  Due back to clinic per last office note: RTN in 1 year by 06/07/2020  Date next office visit scheduled:  No future appointments.     L

## 2020-02-05 RX ORDER — LEVETIRACETAM 750 MG/1
750 TABLET ORAL 2 TIMES DAILY
Qty: 180 TABLET | Refills: 0 | Status: SHIPPED
Start: 2020-02-05 | End: 2020-05-18

## 2020-05-18 DIAGNOSIS — G40.209 PARTIAL SYMPTOMATIC EPILEPSY WITH COMPLEX PARTIAL SEIZURES, NOT INTRACTABLE, WITHOUT STATUS EPILEPTICUS (HCC): ICD-10-CM

## 2020-05-18 RX ORDER — LEVETIRACETAM 750 MG/1
750 TABLET ORAL 2 TIMES DAILY
Qty: 180 TABLET | Refills: 0 | Status: SHIPPED | OUTPATIENT
Start: 2020-05-18 | End: 2020-06-08

## 2020-05-18 NOTE — TELEPHONE ENCOUNTER
Medication: levetiracetam (KEPPRA) 750 MG Oral Tab     Date of last refill: 2/5/2020 (#180/0)  [place] 5/6/9261    Last office visit: 6/7/2019  Due back to clinic per last office note:  1 year  Date next office visit ketan

## 2020-06-08 ENCOUNTER — VIRTUAL PHONE E/M (OUTPATIENT)
Dept: NEUROLOGY | Facility: CLINIC | Age: 55
End: 2020-06-08
Payer: COMMERCIAL

## 2020-06-08 DIAGNOSIS — G40.209 PARTIAL SYMPTOMATIC EPILEPSY WITH COMPLEX PARTIAL SEIZURES, NOT INTRACTABLE, WITHOUT STATUS EPILEPTICUS (HCC): Primary | ICD-10-CM

## 2020-06-08 DIAGNOSIS — Q85.00 NEUROFIBROMATOSIS (HCC): ICD-10-CM

## 2020-06-08 PROCEDURE — 99213 OFFICE O/P EST LOW 20 MIN: CPT | Performed by: OTHER

## 2020-06-08 RX ORDER — LEVETIRACETAM 750 MG/1
750 TABLET ORAL 2 TIMES DAILY
Qty: 180 TABLET | Refills: 3 | Status: SHIPPED | OUTPATIENT
Start: 2020-08-10 | End: 2020-11-08

## 2020-06-08 NOTE — PROGRESS NOTES
Virtual Telephone Check-In    Deven Sun verbally consents to a Virtual/Telephone Check-In visit on 06/08/20. Patient has been referred to the [facility] website at [email or web address] to review the yearly Consent to Treat document.     Patient under

## 2020-06-26 ENCOUNTER — OFFICE VISIT (OUTPATIENT)
Dept: INTERNAL MEDICINE CLINIC | Facility: CLINIC | Age: 55
End: 2020-06-26
Payer: COMMERCIAL

## 2020-06-26 VITALS
WEIGHT: 124.63 LBS | HEIGHT: 60 IN | DIASTOLIC BLOOD PRESSURE: 72 MMHG | SYSTOLIC BLOOD PRESSURE: 110 MMHG | HEART RATE: 79 BPM | RESPIRATION RATE: 16 BRPM | TEMPERATURE: 97 F | BODY MASS INDEX: 24.47 KG/M2 | OXYGEN SATURATION: 98 %

## 2020-06-26 DIAGNOSIS — Z00.00 ROUTINE GENERAL MEDICAL EXAMINATION AT A HEALTH CARE FACILITY: Primary | ICD-10-CM

## 2020-06-26 DIAGNOSIS — Z12.31 BREAST CANCER SCREENING BY MAMMOGRAM: ICD-10-CM

## 2020-06-26 PROBLEM — Z86.010 PERSONAL HISTORY OF COLONIC POLYPS: Status: RESOLVED | Noted: 2019-12-05 | Resolved: 2020-06-26

## 2020-06-26 PROBLEM — D12.3 BENIGN NEOPLASM OF TRANSVERSE COLON: Status: RESOLVED | Noted: 2019-12-05 | Resolved: 2020-06-26

## 2020-06-26 PROBLEM — Z86.0100 PERSONAL HISTORY OF COLONIC POLYPS: Status: RESOLVED | Noted: 2019-12-05 | Resolved: 2020-06-26

## 2020-06-26 PROCEDURE — 99396 PREV VISIT EST AGE 40-64: CPT | Performed by: INTERNAL MEDICINE

## 2020-06-26 NOTE — PROGRESS NOTES
HPI:   Emil Boss is a 53year old female who presents for a complete physical exam.has epilepsy and neurofibromatosis managed by neuro,  and thyroid nodules managed by ENT -appt in [address] [place] [address] OP managed by myself  She sees ENT for thyroid nodules  Sh Yes      Alcohol/week: 5.0 standard drinks      Types: 5 Glasses of wine per week      Comment: With meals    Drug use: No    Occ: teacher, subbing right now ,elementary . : single, lives w/parents .  Children: none, G0 , LMP: menopause 2009 , Exerci varicosities or stasis change  NEURO: Oriented times three,cranial nerves are intact,motor and sensory are grossly intact, DTR's b/l equal,      ASSESSMENT AND PLAN:   Marilee Lopez is a 53year old female who presents for a complete physical exam. Sylvie Marquez

## 2020-06-26 NOTE — PROGRESS NOTES
HPI:   Keaton Ballesteros is a 53year old female who presents for a {Medicare Annual Wellness Description:3401}.     ***  Annual Physical due on 06/27/2020        Fall/Risk Assessment Update needed    Last Fall risk screen was either > 7 days ago and abno spent on all Advance Directive Plannin::\"Advance care planning including the explanation and discussion of advance directives standard forms performed Face to Face with patient and Family/surrogate (if present), and forms available to patient in AVS Age-related osteoporosis without current pathological fracture (7/28/2017), Blood in the stool, Constipation, Flatulence/gas pain/belching, Neurofibromatosis ([facility] Osteopenia of spine (7/21/2017), SEIZURE DISORDER, Seizures ([facility] Thyroid disease, and Wea {good/fair/poor/excellent:[id number]::\"good\"}     PLAN:  The patient indicates understanding of these issues and agrees to the plan. {lifestyle and A/P options:5845::\"Reinforced healthy diet, lifestyle, and exercise. \"}    No follow-ups on file.      Barbi (Update Immunization Activity if applicable)     Influenza  Covered Annually  Please get every year    Pneumococcal 13 (Prevnar)  Covered Once after 65 No vaccine history found Please get once after your 65th birthday    Pneumococcal 23 (Pneumovax)  Covere

## 2020-07-06 ENCOUNTER — LAB ENCOUNTER (OUTPATIENT)
Dept: LAB | Age: 55
End: 2020-07-06
Attending: INTERNAL MEDICINE
Payer: COMMERCIAL

## 2020-07-06 DIAGNOSIS — Z00.00 ROUTINE GENERAL MEDICAL EXAMINATION AT A HEALTH CARE FACILITY: ICD-10-CM

## 2020-07-06 LAB
ALBUMIN SERPL-MCNC: 4 G/DL (ref 3.4–5)
ALBUMIN/GLOB SERPL: 1.1 {RATIO} (ref 1–2)
ALP LIVER SERPL-CCNC: 33 U/L (ref 41–108)
ALT SERPL-CCNC: 19 U/L (ref 13–56)
ANION GAP SERPL CALC-SCNC: 5 MMOL/L (ref 0–18)
AST SERPL-CCNC: 19 U/L (ref 15–37)
BASOPHILS # BLD AUTO: 0.11 X10(3) UL (ref 0–0.2)
BASOPHILS NFR BLD AUTO: 1.2 %
BILIRUB SERPL-MCNC: 0.7 MG/DL (ref 0.1–2)
BUN BLD-MCNC: 13 MG/DL (ref 7–18)
BUN/CREAT SERPL: 14.6 (ref 10–20)
CALCIUM BLD-MCNC: 9 MG/DL (ref 8.5–10.1)
CHLORIDE SERPL-SCNC: 108 MMOL/L (ref 98–112)
CHOLEST SMN-MCNC: 189 MG/DL (ref ?–200)
CO2 SERPL-SCNC: 28 MMOL/L (ref 21–32)
CREAT BLD-MCNC: 0.89 MG/DL (ref 0.55–1.02)
DEPRECATED RDW RBC AUTO: 42.9 FL (ref 35.1–46.3)
EOSINOPHIL # BLD AUTO: 0.21 X10(3) UL (ref 0–0.7)
EOSINOPHIL NFR BLD AUTO: 2.3 %
ERYTHROCYTE [DISTWIDTH] IN BLOOD BY AUTOMATED COUNT: 12.7 % (ref 11–15)
GLOBULIN PLAS-MCNC: 3.6 G/DL (ref 2.8–4.4)
GLUCOSE BLD-MCNC: 101 MG/DL (ref 70–99)
HCT VFR BLD AUTO: 45.7 % (ref 35–48)
HDLC SERPL-MCNC: 72 MG/DL (ref 40–59)
HGB BLD-MCNC: 14.7 G/DL (ref 12–16)
IMM GRANULOCYTES # BLD AUTO: 0.03 X10(3) UL (ref 0–1)
IMM GRANULOCYTES NFR BLD: 0.3 %
LDLC SERPL CALC-MCNC: 98 MG/DL (ref ?–100)
LYMPHOCYTES # BLD AUTO: 2.1 X10(3) UL (ref 1–4)
LYMPHOCYTES NFR BLD AUTO: 23 %
M PROTEIN MFR SERPL ELPH: 7.6 G/DL (ref 6.4–8.2)
MCH RBC QN AUTO: 29.7 PG (ref 26–34)
MCHC RBC AUTO-ENTMCNC: 32.2 G/DL (ref 31–37)
MCV RBC AUTO: 92.3 FL (ref 80–100)
MONOCYTES # BLD AUTO: 0.77 X10(3) UL (ref 0.1–1)
MONOCYTES NFR BLD AUTO: 8.4 %
NEUTROPHILS # BLD AUTO: 5.93 X10 (3) UL (ref 1.5–7.7)
NEUTROPHILS # BLD AUTO: 5.93 X10(3) UL (ref 1.5–7.7)
NEUTROPHILS NFR BLD AUTO: 64.8 %
NONHDLC SERPL-MCNC: 117 MG/DL (ref ?–130)
OSMOLALITY SERPL CALC.SUM OF ELEC: 292 MOSM/KG (ref 275–295)
PATIENT FASTING Y/N/NP: YES
PATIENT FASTING Y/N/NP: YES
PLATELET # BLD AUTO: 279 10(3)UL (ref 150–450)
POTASSIUM SERPL-SCNC: 4.5 MMOL/L (ref 3.5–5.1)
RBC # BLD AUTO: 4.95 X10(6)UL (ref 3.8–5.3)
SODIUM SERPL-SCNC: 141 MMOL/L (ref 136–145)
TRIGL SERPL-MCNC: 96 MG/DL (ref 30–149)
TSI SER-ACNC: 2.96 MIU/ML (ref 0.36–3.74)
VIT D+METAB SERPL-MCNC: 65.1 NG/ML (ref 30–100)
VLDLC SERPL CALC-MCNC: 19 MG/DL (ref 0–30)
WBC # BLD AUTO: 9.2 X10(3) UL (ref 4–11)

## 2020-07-06 PROCEDURE — 84443 ASSAY THYROID STIM HORMONE: CPT

## 2020-07-06 PROCEDURE — 85025 COMPLETE CBC W/AUTO DIFF WBC: CPT

## 2020-07-06 PROCEDURE — 36415 COLL VENOUS BLD VENIPUNCTURE: CPT

## 2020-07-06 PROCEDURE — 80053 COMPREHEN METABOLIC PANEL: CPT

## 2020-07-06 PROCEDURE — 82306 VITAMIN D 25 HYDROXY: CPT

## 2020-07-06 PROCEDURE — 80061 LIPID PANEL: CPT

## 2020-07-10 ENCOUNTER — HOSPITAL ENCOUNTER (OUTPATIENT)
Dept: MAMMOGRAPHY | Age: 55
Discharge: HOME | End: 2020-07-10
Attending: INTERNAL MEDICINE
Payer: COMMERCIAL

## 2020-07-10 DIAGNOSIS — Z12.31 BREAST CANCER SCREENING BY MAMMOGRAM: ICD-10-CM

## 2020-07-10 PROCEDURE — 77067 SCR MAMMO BI INCL CAD: CPT | Performed by: INTERNAL MEDICINE

## 2020-07-10 PROCEDURE — 77063 BREAST TOMOSYNTHESIS BI: CPT | Performed by: INTERNAL MEDICINE

## 2020-07-18 DIAGNOSIS — M81.0 AGE-RELATED OSTEOPOROSIS WITHOUT CURRENT PATHOLOGICAL FRACTURE: Primary | ICD-10-CM

## 2020-07-20 RX ORDER — ALENDRONATE SODIUM 70 MG/1
TABLET ORAL
Qty: 12 TABLET | Refills: 3 | Status: SHIPPED | OUTPATIENT
Start: 2020-07-20 | End: 2021-07-13

## 2020-12-02 ENCOUNTER — HOSPITAL ENCOUNTER (OUTPATIENT)
Dept: ULTRASOUND IMAGING | Facility: HOSPITAL | Age: 55
Discharge: HOME | End: 2020-12-02
Attending: OTOLARYNGOLOGY
Payer: COMMERCIAL

## 2020-12-02 DIAGNOSIS — E04.1 THYROID NODULE: ICD-10-CM

## 2020-12-02 DIAGNOSIS — E07.9 THYROID DISORDER: ICD-10-CM

## 2020-12-02 PROCEDURE — 76536 US EXAM OF HEAD AND NECK: CPT | Performed by: OTOLARYNGOLOGY

## 2020-12-29 ENCOUNTER — PATIENT MESSAGE (OUTPATIENT)
Dept: INTERNAL MEDICINE CLINIC | Facility: CLINIC | Age: 55
End: 2020-12-29

## 2020-12-30 NOTE — TELEPHONE ENCOUNTER
From: Esme Whitley  To: Gauri Clark MD  Sent: 12/29/2020 10:15 AM CST  Subject: Other    Dr. Ayana Rosenberg,  I just wanted to update you on my immunizations. I received the second shingles shot on Dec. 26.  In addition I got the MMR shot at

## 2021-03-07 ENCOUNTER — PATIENT MESSAGE (OUTPATIENT)
Dept: INTERNAL MEDICINE CLINIC | Facility: CLINIC | Age: 56
End: 2021-03-07

## 2021-03-08 ENCOUNTER — PATIENT MESSAGE (OUTPATIENT)
Dept: INTERNAL MEDICINE CLINIC | Facility: CLINIC | Age: 56
End: 2021-03-08

## 2021-03-08 NOTE — TELEPHONE ENCOUNTER
From: Krzysztof Whitley  To: Sancho Krishnamurthy MD  Sent: 3/7/2021 5:09 PM CST  Subject: Other    Dr. Ny Kimball,  I wanted you to know that I have received both doses of the Pfizer Covid 19 vaccine.  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

## 2021-03-08 NOTE — TELEPHONE ENCOUNTER
From: Kori Whitley  To: Gal Canela MD  Sent: 3/8/2021 11:55 AM CST  Subject: Anthony Peradha,  How do a send a photo or copy of my Covid vaccine card to [facility]? I have never done that before and don't know how. Thank you for your help.   Nancy Moreland

## 2021-06-08 ENCOUNTER — OFFICE VISIT (OUTPATIENT)
Dept: NEUROLOGY | Facility: CLINIC | Age: 56
End: 2021-06-08
Payer: COMMERCIAL

## 2021-06-08 ENCOUNTER — TELEPHONE (OUTPATIENT)
Dept: NEUROLOGY | Facility: CLINIC | Age: 56
End: 2021-06-08

## 2021-06-08 VITALS
HEART RATE: 68 BPM | WEIGHT: 124 LBS | BODY MASS INDEX: 24.35 KG/M2 | SYSTOLIC BLOOD PRESSURE: 112 MMHG | DIASTOLIC BLOOD PRESSURE: 70 MMHG | RESPIRATION RATE: 16 BRPM | HEIGHT: 60 IN

## 2021-06-08 DIAGNOSIS — G40.209 PARTIAL SYMPTOMATIC EPILEPSY WITH COMPLEX PARTIAL SEIZURES, NOT INTRACTABLE, WITHOUT STATUS EPILEPTICUS (HCC): Primary | ICD-10-CM

## 2021-06-08 DIAGNOSIS — Q85.00 NEUROFIBROMATOSIS (HCC): ICD-10-CM

## 2021-06-08 PROCEDURE — 3074F SYST BP LT 130 MM HG: CPT | Performed by: OTHER

## 2021-06-08 PROCEDURE — 3008F BODY MASS INDEX DOCD: CPT | Performed by: OTHER

## 2021-06-08 PROCEDURE — 3078F DIAST BP <80 MM HG: CPT | Performed by: OTHER

## 2021-06-08 PROCEDURE — 99214 OFFICE O/P EST MOD 30 MIN: CPT | Performed by: OTHER

## 2021-06-08 RX ORDER — LEVETIRACETAM 750 MG/1
750 TABLET ORAL 2 TIMES DAILY
Qty: 180 TABLET | Refills: 3 | Status: SHIPPED | OUTPATIENT
Start: 2021-06-08

## 2021-06-08 RX ORDER — LEVETIRACETAM 750 MG/1
750 TABLET ORAL 2 TIMES DAILY
COMMUNITY
End: 2021-06-08

## 2021-06-08 NOTE — PROGRESS NOTES
[facility] with Lexi Whitley  8/10/1965  Primary Care Provider:  Yara Bell MD    6/8/2021  Accompanied visit:      (x) No.        63year old yo patient being seen f complex partial seizures, not intractable, without status epilepticus ([facility]  (primary encounter diagnosis)  [facility]fib[facility] ([facility]    Discussion plus Diagnostics & Treatment Orders:  Clinically stable neurologic condition  Renewed necessary labs and tests

## 2021-06-08 NOTE — TELEPHONE ENCOUNTER
Pt calling to verify if her levetiracetam rx was sent to [facility].  Confirmed it was sent this morning at 9am.

## 2021-06-28 ENCOUNTER — OFFICE VISIT (OUTPATIENT)
Dept: INTERNAL MEDICINE CLINIC | Facility: CLINIC | Age: 56
End: 2021-06-28
Payer: COMMERCIAL

## 2021-06-28 VITALS
RESPIRATION RATE: 14 BRPM | HEIGHT: 60 IN | SYSTOLIC BLOOD PRESSURE: 102 MMHG | BODY MASS INDEX: 22.97 KG/M2 | HEART RATE: 79 BPM | OXYGEN SATURATION: 96 % | WEIGHT: 117 LBS | TEMPERATURE: 99 F | DIASTOLIC BLOOD PRESSURE: 62 MMHG

## 2021-06-28 DIAGNOSIS — Z13.220 SCREENING FOR HYPERCHOLESTEROLEMIA: ICD-10-CM

## 2021-06-28 DIAGNOSIS — Z13.29 SCREENING FOR THYROID DISORDER: ICD-10-CM

## 2021-06-28 DIAGNOSIS — Z78.0 POSTMENOPAUSAL: ICD-10-CM

## 2021-06-28 DIAGNOSIS — Z12.83 SCREENING FOR SKIN CANCER: ICD-10-CM

## 2021-06-28 DIAGNOSIS — Z00.00 ENCOUNTER FOR ANNUAL PHYSICAL EXAM: Primary | ICD-10-CM

## 2021-06-28 DIAGNOSIS — M81.0 AGE-RELATED OSTEOPOROSIS WITHOUT CURRENT PATHOLOGICAL FRACTURE: ICD-10-CM

## 2021-06-28 DIAGNOSIS — E04.2 MULTIPLE THYROID NODULES: ICD-10-CM

## 2021-06-28 DIAGNOSIS — Q85.00 NEUROFIBROMATOSIS (HCC): ICD-10-CM

## 2021-06-28 DIAGNOSIS — G40.209 PARTIAL SYMPTOMATIC EPILEPSY WITH COMPLEX PARTIAL SEIZURES, NOT INTRACTABLE, WITHOUT STATUS EPILEPTICUS (HCC): ICD-10-CM

## 2021-06-28 DIAGNOSIS — Z12.31 BREAST CANCER SCREENING BY MAMMOGRAM: ICD-10-CM

## 2021-06-28 DIAGNOSIS — E55.9 VITAMIN D DEFICIENCY: ICD-10-CM

## 2021-06-28 PROCEDURE — 99396 PREV VISIT EST AGE 40-64: CPT | Performed by: NURSE PRACTITIONER

## 2021-06-28 PROCEDURE — 3078F DIAST BP <80 MM HG: CPT | Performed by: NURSE PRACTITIONER

## 2021-06-28 PROCEDURE — 3008F BODY MASS INDEX DOCD: CPT | Performed by: NURSE PRACTITIONER

## 2021-06-28 PROCEDURE — 3074F SYST BP LT 130 MM HG: CPT | Performed by: NURSE PRACTITIONER

## 2021-06-28 NOTE — PROGRESS NOTES
CHIEF COMPLAINT   Well woman exam    HPI:   Lachelle Hendrickson is a 63year old female who presents for a complete physical exam.      Wt Readings from Last 6 Encounters:  06/28/21 : 117 lb (53.1 kg)  06/08/21 : 124 lb (56.2 kg)  06/26/20 : 124 lb 9.6 oz (5 sigmoid colon 7/21/2017   • Age-related osteoporosis without current pathological fracture 7/28/2017   • Blood in the stool    • Constipation    • Flatulence/gas pain/belching    • Neurofibromatosis ([address]    • Osteopenia of spine 7/21/2017   • SEIZURE DISO Sitting, Cuff Size: adult)   Pulse 79   Temp 98.8 °F (37.1 °C) (Temporal)   Resp 14   Ht 5' (1.524 m)   Wt 117 lb (53.1 kg)   SpO2 96%   BMI 22.85 kg/m²   Body mass index is 22.85 kg/m².    GENERAL: well developed, well nourished,in no apparent distress  SK Jason Clark is a 63year old female who presents for a complete physical exam.  Pap is not due. No vaginal concerns. Pt' s Body mass index is 22.85 kg/m². , recommended regular exercise. Vaccines are up-to-date. Labs ordered.   DEXA and mammogram o

## 2021-06-28 NOTE — PATIENT INSTRUCTIONS
Get your labs done. You should be fasting for at least 10 hours. If you take a multivitamin with Biotin or any biotin product it should be held for 3 days prior to getting your labs done. Get your thyroid ultrasound completed in December when due.     [employer]

## 2021-07-02 ENCOUNTER — LAB ENCOUNTER (OUTPATIENT)
Dept: LAB | Age: 56
End: 2021-07-02
Attending: NURSE PRACTITIONER
Payer: COMMERCIAL

## 2021-07-02 DIAGNOSIS — E55.9 VITAMIN D DEFICIENCY: ICD-10-CM

## 2021-07-02 DIAGNOSIS — Z13.29 SCREENING FOR THYROID DISORDER: ICD-10-CM

## 2021-07-02 DIAGNOSIS — Z13.220 SCREENING FOR HYPERCHOLESTEROLEMIA: ICD-10-CM

## 2021-07-02 DIAGNOSIS — Z00.00 ENCOUNTER FOR ANNUAL PHYSICAL EXAM: ICD-10-CM

## 2021-07-02 LAB
ALBUMIN SERPL-MCNC: 4.1 G/DL (ref 3.4–5)
ALBUMIN/GLOB SERPL: 1.2 {RATIO} (ref 1–2)
ALP LIVER SERPL-CCNC: 32 U/L
ALT SERPL-CCNC: 23 U/L
ANION GAP SERPL CALC-SCNC: 7 MMOL/L (ref 0–18)
AST SERPL-CCNC: 16 U/L (ref 15–37)
BASOPHILS # BLD AUTO: 0.09 X10(3) UL (ref 0–0.2)
BASOPHILS NFR BLD AUTO: 1.1 %
BILIRUB SERPL-MCNC: 0.7 MG/DL (ref 0.1–2)
BUN BLD-MCNC: 13 MG/DL (ref 7–18)
BUN/CREAT SERPL: 16.9 (ref 10–20)
CALCIUM BLD-MCNC: 9.3 MG/DL (ref 8.5–10.1)
CHLORIDE SERPL-SCNC: 107 MMOL/L (ref 98–112)
CHOLEST SMN-MCNC: 182 MG/DL (ref ?–200)
CO2 SERPL-SCNC: 26 MMOL/L (ref 21–32)
CREAT BLD-MCNC: 0.77 MG/DL
DEPRECATED RDW RBC AUTO: 43.1 FL (ref 35.1–46.3)
EOSINOPHIL # BLD AUTO: 0.15 X10(3) UL (ref 0–0.7)
EOSINOPHIL NFR BLD AUTO: 1.9 %
ERYTHROCYTE [DISTWIDTH] IN BLOOD BY AUTOMATED COUNT: 13.1 % (ref 11–15)
GLOBULIN PLAS-MCNC: 3.5 G/DL (ref 2.8–4.4)
GLUCOSE BLD-MCNC: 93 MG/DL (ref 70–99)
HCT VFR BLD AUTO: 44.9 %
HDLC SERPL-MCNC: 70 MG/DL (ref 40–59)
HGB BLD-MCNC: 14.5 G/DL
IMM GRANULOCYTES # BLD AUTO: 0.03 X10(3) UL (ref 0–1)
IMM GRANULOCYTES NFR BLD: 0.4 %
LDLC SERPL CALC-MCNC: 92 MG/DL (ref ?–100)
LYMPHOCYTES # BLD AUTO: 2.31 X10(3) UL (ref 1–4)
LYMPHOCYTES NFR BLD AUTO: 28.7 %
M PROTEIN MFR SERPL ELPH: 7.6 G/DL (ref 6.4–8.2)
MCH RBC QN AUTO: 29.5 PG (ref 26–34)
MCHC RBC AUTO-ENTMCNC: 32.3 G/DL (ref 31–37)
MCV RBC AUTO: 91.4 FL
MONOCYTES # BLD AUTO: 0.89 X10(3) UL (ref 0.1–1)
MONOCYTES NFR BLD AUTO: 11.1 %
NEUTROPHILS # BLD AUTO: 4.58 X10 (3) UL (ref 1.5–7.7)
NEUTROPHILS # BLD AUTO: 4.58 X10(3) UL (ref 1.5–7.7)
NEUTROPHILS NFR BLD AUTO: 56.8 %
NONHDLC SERPL-MCNC: 112 MG/DL (ref ?–130)
OSMOLALITY SERPL CALC.SUM OF ELEC: 290 MOSM/KG (ref 275–295)
PATIENT FASTING Y/N/NP: YES
PATIENT FASTING Y/N/NP: YES
PLATELET # BLD AUTO: 309 10(3)UL (ref 150–450)
POTASSIUM SERPL-SCNC: 4.2 MMOL/L (ref 3.5–5.1)
RBC # BLD AUTO: 4.91 X10(6)UL
SODIUM SERPL-SCNC: 140 MMOL/L (ref 136–145)
TRIGL SERPL-MCNC: 112 MG/DL (ref 30–149)
TSI SER-ACNC: 2.33 MIU/ML (ref 0.36–3.74)
VIT D+METAB SERPL-MCNC: 73.1 NG/ML (ref 30–100)
VLDLC SERPL CALC-MCNC: 18 MG/DL (ref 0–30)
WBC # BLD AUTO: 8.1 X10(3) UL (ref 4–11)

## 2021-07-02 PROCEDURE — 80061 LIPID PANEL: CPT | Performed by: NURSE PRACTITIONER

## 2021-07-02 PROCEDURE — 80050 GENERAL HEALTH PANEL: CPT | Performed by: NURSE PRACTITIONER

## 2021-07-02 PROCEDURE — 82306 VITAMIN D 25 HYDROXY: CPT | Performed by: NURSE PRACTITIONER

## 2021-07-08 ENCOUNTER — PATIENT MESSAGE (OUTPATIENT)
Dept: INTERNAL MEDICINE CLINIC | Facility: CLINIC | Age: 56
End: 2021-07-08

## 2021-07-12 NOTE — TELEPHONE ENCOUNTER
From: Kori Whitley  To: MARK Morrow  Sent: 7/8/2021 2:23 PM CDT  Subject: Test Results Question    Dear Maryann Jennings,  I am wondering why my test results were updated. Both sets of the results were exactly the same.   Butch Chairez

## 2021-07-13 ENCOUNTER — HOSPITAL ENCOUNTER (OUTPATIENT)
Dept: BONE DENSITY | Age: 56
Discharge: HOME | End: 2021-07-13
Attending: NURSE PRACTITIONER
Payer: COMMERCIAL

## 2021-07-13 ENCOUNTER — HOSPITAL ENCOUNTER (OUTPATIENT)
Dept: MAMMOGRAPHY | Age: 56
Discharge: HOME | End: 2021-07-13
Attending: NURSE PRACTITIONER
Payer: COMMERCIAL

## 2021-07-13 ENCOUNTER — PATIENT MESSAGE (OUTPATIENT)
Dept: INTERNAL MEDICINE CLINIC | Facility: CLINIC | Age: 56
End: 2021-07-13

## 2021-07-13 DIAGNOSIS — M81.0 AGE-RELATED OSTEOPOROSIS WITHOUT CURRENT PATHOLOGICAL FRACTURE: ICD-10-CM

## 2021-07-13 DIAGNOSIS — Z12.31 BREAST CANCER SCREENING BY MAMMOGRAM: ICD-10-CM

## 2021-07-13 DIAGNOSIS — Z78.0 POSTMENOPAUSAL: ICD-10-CM

## 2021-07-13 PROCEDURE — 77063 BREAST TOMOSYNTHESIS BI: CPT | Performed by: NURSE PRACTITIONER

## 2021-07-13 PROCEDURE — 77080 DXA BONE DENSITY AXIAL: CPT | Performed by: NURSE PRACTITIONER

## 2021-07-13 PROCEDURE — 77067 SCR MAMMO BI INCL CAD: CPT | Performed by: NURSE PRACTITIONER

## 2021-07-13 RX ORDER — ALENDRONATE SODIUM 70 MG/1
TABLET ORAL
Qty: 12 TABLET | Refills: 3 | Status: SHIPPED | OUTPATIENT
Start: 2021-07-13

## 2021-07-13 NOTE — TELEPHONE ENCOUNTER
From: Griselda Nares Cox  To: MARK Ferguson  Sent: 7/13/2021 9:58 AM CDT  Subject: Test Results Question    Dear Lindy Andujar,  Thank you for your response to my question about the blood tests. . However, I was not comparing the results to last year's res

## 2021-07-13 NOTE — TELEPHONE ENCOUNTER
Did the patient contact the pharmacy directly?:  no    Is patient out of meds or supply very low?:  low    Medication Requested:  alendronate 70 MG Oral Tab    Dose:  70 MG    Is patient requesting a 30 or 90 day supply?:   3 month suplply    Pharmacy name

## 2021-07-30 ENCOUNTER — MED REC SCAN ONLY (OUTPATIENT)
Dept: INTERNAL MEDICINE CLINIC | Facility: CLINIC | Age: 56
End: 2021-07-30

## 2021-11-24 ENCOUNTER — HOSPITAL ENCOUNTER (OUTPATIENT)
Dept: ULTRASOUND IMAGING | Facility: HOSPITAL | Age: 56
Discharge: HOME | End: 2021-11-24
Attending: PHYSICIAN ASSISTANT
Payer: COMMERCIAL

## 2021-11-24 DIAGNOSIS — E04.2 MULTIPLE THYROID NODULES: ICD-10-CM

## 2021-11-24 PROCEDURE — 76536 US EXAM OF HEAD AND NECK: CPT | Performed by: PHYSICIAN ASSISTANT

## 2022-06-03 DIAGNOSIS — M81.0 AGE-RELATED OSTEOPOROSIS WITHOUT CURRENT PATHOLOGICAL FRACTURE: ICD-10-CM

## 2022-06-06 ENCOUNTER — PATIENT MESSAGE (OUTPATIENT)
Dept: INTERNAL MEDICINE CLINIC | Facility: CLINIC | Age: 57
End: 2022-06-06

## 2022-06-06 RX ORDER — ALENDRONATE SODIUM 70 MG/1
TABLET ORAL
Qty: 4 TABLET | Refills: 0 | Status: SHIPPED | OUTPATIENT
Start: 2022-06-06

## 2022-06-06 NOTE — TELEPHONE ENCOUNTER
From: Mireille Whitley  To: Preethi Jacques MD  Sent: 6/6/2022 10:59 AM CDT  Subject: allendronate renewal    Dr. Sumanth Davenport,  I see that my allendronate has been renewed. I was wondering if I was going to still need to take this medicine. I believe I am at the threshold for the number of years I am supposed to be taking this medicine. I have three weeks left of this prescription. Should I  this prescription or put it on hold? My annual physical is at the end of the month. Thanks for your assistance.   Simba Godfrey

## 2022-06-06 NOTE — TELEPHONE ENCOUNTER
From: Mendoza Whitley  To: Jesse Chaney MD  Sent: 6/6/2022 10:59 AM CDT  Subject: allendronate renewal    Dr. Jaki Camejo,  I see that my allendronate has been renewed. I was wondering if I was going to still need to take this medicine. I believe I am at the threshold for the number of years I am supposed to be taking this medicine. I have three weeks left of this prescription. Should I  this prescription or put it on hold? My annual physical is at the end of the month. Thanks for your assistance.   Lewis Encarnacion

## 2022-06-10 ENCOUNTER — OFFICE VISIT (OUTPATIENT)
Dept: NEUROLOGY | Facility: CLINIC | Age: 57
End: 2022-06-10
Payer: COMMERCIAL

## 2022-06-10 VITALS
SYSTOLIC BLOOD PRESSURE: 108 MMHG | HEART RATE: 76 BPM | DIASTOLIC BLOOD PRESSURE: 72 MMHG | WEIGHT: 117 LBS | RESPIRATION RATE: 16 BRPM | HEIGHT: 60 IN | BODY MASS INDEX: 22.97 KG/M2

## 2022-06-10 DIAGNOSIS — G40.209 PARTIAL SYMPTOMATIC EPILEPSY WITH COMPLEX PARTIAL SEIZURES, NOT INTRACTABLE, WITHOUT STATUS EPILEPTICUS (HCC): Primary | ICD-10-CM

## 2022-06-10 DIAGNOSIS — Q85.00 NEUROFIBROMATOSIS (HCC): ICD-10-CM

## 2022-06-10 PROCEDURE — 99213 OFFICE O/P EST LOW 20 MIN: CPT | Performed by: OTHER

## 2022-06-10 PROCEDURE — 3074F SYST BP LT 130 MM HG: CPT | Performed by: OTHER

## 2022-06-10 PROCEDURE — 3008F BODY MASS INDEX DOCD: CPT | Performed by: OTHER

## 2022-06-10 PROCEDURE — 3078F DIAST BP <80 MM HG: CPT | Performed by: OTHER

## 2022-06-27 ENCOUNTER — PATIENT MESSAGE (OUTPATIENT)
Dept: INTERNAL MEDICINE CLINIC | Facility: CLINIC | Age: 57
End: 2022-06-27

## 2022-06-27 DIAGNOSIS — E55.9 VITAMIN D DEFICIENCY: Primary | ICD-10-CM

## 2022-06-27 DIAGNOSIS — E04.1 THYROID NODULE: ICD-10-CM

## 2022-06-27 DIAGNOSIS — Z00.00 LABORATORY EXAM ORDERED AS PART OF ROUTINE GENERAL MEDICAL EXAMINATION: ICD-10-CM

## 2022-06-27 NOTE — TELEPHONE ENCOUNTER
From: Checo Whitley  To: Debra Gifford MD  Sent: 6/27/2022 6:41 AM CDT  Subject: physical on Thursday    Dr. Anette Lopez,  I just want to be sure that the TSH and FreeT4 blood tests for my thyroid are included in your blood work order. These are the tests that Dr. Pastor Escalera needs. They will be paid for by the insurance if they are included with my annual physical bloodwork. You have included these before, but I just need to be sure. I also need to update the health questionnaire I filled out I forgot to add something. See you on Thursday.   Thank you, Stan Dean

## 2022-06-27 NOTE — TELEPHONE ENCOUNTER
Patient has the following physical appointment:     Future Appointments   Date Time Provider Trevor Singhi   6/30/2022 10:00 AM Ayesha Jackson MD [facility] [place]     Last labs:   CBC, CMP, LIPID, TSH with reflex, Vitamin D on 7/2/21    Diagnosis:   Adenomatous polyp of sigmoid colon    Age-related osteoporosis without current pathological fracture    Epilepsy, complex partial ([address]    Insomnia    Multiple thyroid nodules    Neurofibromatosis ([address]      Labs pended if okay.

## 2022-06-28 ENCOUNTER — PATIENT MESSAGE (OUTPATIENT)
Dept: INTERNAL MEDICINE CLINIC | Facility: CLINIC | Age: 57
End: 2022-06-28

## 2022-06-29 ENCOUNTER — LAB ENCOUNTER (OUTPATIENT)
Dept: LAB | Age: 57
End: 2022-06-29
Attending: INTERNAL MEDICINE
Payer: COMMERCIAL

## 2022-06-29 ENCOUNTER — PATIENT MESSAGE (OUTPATIENT)
Dept: INTERNAL MEDICINE CLINIC | Facility: CLINIC | Age: 57
End: 2022-06-29

## 2022-06-29 DIAGNOSIS — Z00.00 LABORATORY EXAM ORDERED AS PART OF ROUTINE GENERAL MEDICAL EXAMINATION: ICD-10-CM

## 2022-06-29 DIAGNOSIS — E55.9 VITAMIN D DEFICIENCY: ICD-10-CM

## 2022-06-29 LAB
ALBUMIN SERPL-MCNC: 4.1 G/DL (ref 3.4–5)
ALBUMIN/GLOB SERPL: 1.1 {RATIO} (ref 1–2)
ALP LIVER SERPL-CCNC: 35 U/L
ALT SERPL-CCNC: 20 U/L
ANION GAP SERPL CALC-SCNC: 7 MMOL/L (ref 0–18)
AST SERPL-CCNC: 23 U/L (ref 15–37)
BASOPHILS # BLD AUTO: 0.08 X10(3) UL (ref 0–0.2)
BASOPHILS NFR BLD AUTO: 1 %
BILIRUB SERPL-MCNC: 0.6 MG/DL (ref 0.1–2)
BUN BLD-MCNC: 14 MG/DL (ref 7–18)
CALCIUM BLD-MCNC: 9.8 MG/DL (ref 8.5–10.1)
CHLORIDE SERPL-SCNC: 107 MMOL/L (ref 98–112)
CHOLEST SERPL-MCNC: 179 MG/DL (ref ?–200)
CO2 SERPL-SCNC: 26 MMOL/L (ref 21–32)
CREAT BLD-MCNC: 0.98 MG/DL
EOSINOPHIL # BLD AUTO: 0.13 X10(3) UL (ref 0–0.7)
EOSINOPHIL NFR BLD AUTO: 1.7 %
ERYTHROCYTE [DISTWIDTH] IN BLOOD BY AUTOMATED COUNT: 13.1 %
FASTING PATIENT LIPID ANSWER: YES
FASTING STATUS PATIENT QL REPORTED: YES
GLOBULIN PLAS-MCNC: 3.6 G/DL (ref 2.8–4.4)
GLUCOSE BLD-MCNC: 95 MG/DL (ref 70–99)
HCT VFR BLD AUTO: 45.6 %
HDLC SERPL-MCNC: 72 MG/DL (ref 40–59)
HGB BLD-MCNC: 14.5 G/DL
IMM GRANULOCYTES # BLD AUTO: 0.02 X10(3) UL (ref 0–1)
IMM GRANULOCYTES NFR BLD: 0.3 %
LDLC SERPL CALC-MCNC: 92 MG/DL (ref ?–100)
LYMPHOCYTES # BLD AUTO: 1.75 X10(3) UL (ref 1–4)
LYMPHOCYTES NFR BLD AUTO: 22.8 %
MCH RBC QN AUTO: 29.3 PG (ref 26–34)
MCHC RBC AUTO-ENTMCNC: 31.8 G/DL (ref 31–37)
MCV RBC AUTO: 92.1 FL
MONOCYTES # BLD AUTO: 0.9 X10(3) UL (ref 0.1–1)
MONOCYTES NFR BLD AUTO: 11.7 %
NEUTROPHILS # BLD AUTO: 4.78 X10 (3) UL (ref 1.5–7.7)
NEUTROPHILS # BLD AUTO: 4.78 X10(3) UL (ref 1.5–7.7)
NEUTROPHILS NFR BLD AUTO: 62.5 %
NONHDLC SERPL-MCNC: 107 MG/DL (ref ?–130)
OSMOLALITY SERPL CALC.SUM OF ELEC: 290 MOSM/KG (ref 275–295)
PLATELET # BLD AUTO: 307 10(3)UL (ref 150–450)
POTASSIUM SERPL-SCNC: 4.8 MMOL/L (ref 3.5–5.1)
PROT SERPL-MCNC: 7.7 G/DL (ref 6.4–8.2)
RBC # BLD AUTO: 4.95 X10(6)UL
SODIUM SERPL-SCNC: 140 MMOL/L (ref 136–145)
T4 FREE SERPL-MCNC: 0.9 NG/DL (ref 0.8–1.7)
TRIGL SERPL-MCNC: 84 MG/DL (ref 30–149)
TSI SER-ACNC: 1.53 MIU/ML (ref 0.36–3.74)
VIT D+METAB SERPL-MCNC: 79.5 NG/ML (ref 30–100)
VLDLC SERPL CALC-MCNC: 14 MG/DL (ref 0–30)
WBC # BLD AUTO: 7.7 X10(3) UL (ref 4–11)

## 2022-06-29 PROCEDURE — 80061 LIPID PANEL: CPT | Performed by: INTERNAL MEDICINE

## 2022-06-29 PROCEDURE — 85025 COMPLETE CBC W/AUTO DIFF WBC: CPT | Performed by: INTERNAL MEDICINE

## 2022-06-29 PROCEDURE — 82306 VITAMIN D 25 HYDROXY: CPT | Performed by: INTERNAL MEDICINE

## 2022-06-29 PROCEDURE — 80050 GENERAL HEALTH PANEL: CPT | Performed by: INTERNAL MEDICINE

## 2022-06-29 PROCEDURE — 84439 ASSAY OF FREE THYROXINE: CPT | Performed by: INTERNAL MEDICINE

## 2022-06-29 NOTE — TELEPHONE ENCOUNTER
From: Thanh Whitley  To: Grabiel Swan MD  Sent: 6/29/2022 12:33 PM CDT  Subject: blood test results    I had my bloodwork done this morning . There were 4-5 things being checked. I received only 1 result back. Are the results sent as they are completed? I remember getting them all at the same time. I'm sorry I don't want something lost. Thanks for your help.     Sarah Avila

## 2022-06-30 ENCOUNTER — OFFICE VISIT (OUTPATIENT)
Dept: INTERNAL MEDICINE CLINIC | Facility: CLINIC | Age: 57
End: 2022-06-30
Payer: COMMERCIAL

## 2022-06-30 VITALS
HEIGHT: 60.24 IN | BODY MASS INDEX: 21 KG/M2 | WEIGHT: 108.38 LBS | RESPIRATION RATE: 18 BRPM | DIASTOLIC BLOOD PRESSURE: 68 MMHG | OXYGEN SATURATION: 97 % | TEMPERATURE: 98 F | SYSTOLIC BLOOD PRESSURE: 100 MMHG | HEART RATE: 85 BPM

## 2022-06-30 DIAGNOSIS — M81.0 AGE-RELATED OSTEOPOROSIS WITHOUT CURRENT PATHOLOGICAL FRACTURE: ICD-10-CM

## 2022-06-30 DIAGNOSIS — Z12.31 ENCOUNTER FOR SCREENING MAMMOGRAM FOR BREAST CANCER: ICD-10-CM

## 2022-06-30 DIAGNOSIS — Z00.00 ROUTINE GENERAL MEDICAL EXAMINATION AT A HEALTH CARE FACILITY: Primary | ICD-10-CM

## 2022-06-30 DIAGNOSIS — Z12.4 CERVICAL CANCER SCREENING: ICD-10-CM

## 2022-06-30 PROCEDURE — 3074F SYST BP LT 130 MM HG: CPT | Performed by: INTERNAL MEDICINE

## 2022-06-30 PROCEDURE — 3008F BODY MASS INDEX DOCD: CPT | Performed by: INTERNAL MEDICINE

## 2022-06-30 PROCEDURE — 99396 PREV VISIT EST AGE 40-64: CPT | Performed by: INTERNAL MEDICINE

## 2022-06-30 PROCEDURE — 3078F DIAST BP <80 MM HG: CPT | Performed by: INTERNAL MEDICINE

## 2022-06-30 RX ORDER — ALENDRONATE SODIUM 70 MG/1
TABLET ORAL
Qty: 12 TABLET | Refills: 3 | Status: SHIPPED | OUTPATIENT
Start: 2022-06-30

## 2022-07-13 LAB — HPV I/H RISK 1 DNA SPEC QL NAA+PROBE: NEGATIVE

## 2022-07-14 ENCOUNTER — HOSPITAL ENCOUNTER (OUTPATIENT)
Dept: MAMMOGRAPHY | Age: 57
Discharge: HOME | End: 2022-07-14
Attending: INTERNAL MEDICINE
Payer: COMMERCIAL

## 2022-07-14 DIAGNOSIS — Z12.31 ENCOUNTER FOR SCREENING MAMMOGRAM FOR BREAST CANCER: ICD-10-CM

## 2022-07-14 PROCEDURE — 77067 SCR MAMMO BI INCL CAD: CPT | Performed by: INTERNAL MEDICINE

## 2022-07-14 PROCEDURE — 77063 BREAST TOMOSYNTHESIS BI: CPT | Performed by: INTERNAL MEDICINE

## 2022-09-01 DIAGNOSIS — G40.209 PARTIAL SYMPTOMATIC EPILEPSY WITH COMPLEX PARTIAL SEIZURES, NOT INTRACTABLE, WITHOUT STATUS EPILEPTICUS (HCC): Primary | ICD-10-CM

## 2022-09-01 RX ORDER — LEVETIRACETAM 750 MG/1
750 TABLET ORAL 2 TIMES DAILY
Qty: 180 TABLET | Refills: 2 | Status: SHIPPED | OUTPATIENT
Start: 2022-09-01

## 2022-09-28 NOTE — Clinical Note
Thank you for the consult. I saw Ms. Whitley in  the endocrine/diabetes clinic today. Please see attached my note. Please feel free to contact me with any questions. Thanks!
none

## 2022-11-22 ENCOUNTER — HOSPITAL ENCOUNTER (OUTPATIENT)
Dept: ULTRASOUND IMAGING | Facility: HOSPITAL | Age: 57
Discharge: HOME | End: 2022-11-22
Attending: PHYSICIAN ASSISTANT
Payer: COMMERCIAL

## 2022-11-22 DIAGNOSIS — E04.2 MULTIPLE THYROID NODULES: ICD-10-CM

## 2022-11-22 PROCEDURE — 76536 US EXAM OF HEAD AND NECK: CPT | Performed by: PHYSICIAN ASSISTANT

## 2023-05-15 ENCOUNTER — TELEPHONE (OUTPATIENT)
Dept: INTERNAL MEDICINE CLINIC | Facility: CLINIC | Age: 58
End: 2023-05-15

## 2023-05-15 DIAGNOSIS — M81.0 AGE-RELATED OSTEOPOROSIS WITHOUT CURRENT PATHOLOGICAL FRACTURE: ICD-10-CM

## 2023-05-15 RX ORDER — ALENDRONATE SODIUM 70 MG/1
TABLET ORAL
Qty: 12 TABLET | Refills: 0 | Status: SHIPPED | OUTPATIENT
Start: 2023-05-15

## 2023-05-15 NOTE — TELEPHONE ENCOUNTER
Is this medication prescribed by the [facility] Providers? Yes Viraj Boyle)    Did the patient contact the pharmacy directly?:  Yes    Is patient out of meds or supply very low?:  Yes, a few days    Medication Requested:  alendronate     Dose:   70 MG Oral Tab    Is patient requesting a 30 or 90 day supply?:  90    Pharmacy name and phone # or location: [facility]. [address], [address] [facility] [address] [place] [address].., [phone], [phone]     Is the patient due for an appointment?: No  (if so, please schedule appt)    Additional Notes:  Originally prescribed by Eddie Borja, needs renewal of medication approved by Dr Jhonathan Fox    Please advise the patient refills take up to 72 business hours.

## 2023-05-15 NOTE — TELEPHONE ENCOUNTER
Osteoporosis Medication Protocol Passed 05/15/2023 08:33 AM   Protocol Details  DEXA scan within past 2 years    CMP within the past 12 months    Calcium level between 8.3 and 10.3    GFR level greater than 35    Appointment within past 12 or next 3 months   Age-related osteoporosis without current pathological fracture- CPM w/fosamax,     Refilled per protocol.

## 2023-06-07 ENCOUNTER — OFFICE VISIT (OUTPATIENT)
Dept: NEUROLOGY | Facility: CLINIC | Age: 58
End: 2023-06-07
Payer: COMMERCIAL

## 2023-06-07 VITALS
OXYGEN SATURATION: 99 % | HEART RATE: 93 BPM | RESPIRATION RATE: 18 BRPM | BODY MASS INDEX: 18.65 KG/M2 | WEIGHT: 95 LBS | HEIGHT: 60 IN | SYSTOLIC BLOOD PRESSURE: 103 MMHG | DIASTOLIC BLOOD PRESSURE: 52 MMHG

## 2023-06-07 DIAGNOSIS — Q85.00 NEUROFIBROMATOSIS (HCC): ICD-10-CM

## 2023-06-07 DIAGNOSIS — G40.209 PARTIAL SYMPTOMATIC EPILEPSY WITH COMPLEX PARTIAL SEIZURES, NOT INTRACTABLE, WITHOUT STATUS EPILEPTICUS (HCC): Primary | ICD-10-CM

## 2023-06-07 PROCEDURE — 3008F BODY MASS INDEX DOCD: CPT | Performed by: OTHER

## 2023-06-07 PROCEDURE — 3074F SYST BP LT 130 MM HG: CPT | Performed by: OTHER

## 2023-06-07 PROCEDURE — 99214 OFFICE O/P EST MOD 30 MIN: CPT | Performed by: OTHER

## 2023-06-07 PROCEDURE — 3078F DIAST BP <80 MM HG: CPT | Performed by: OTHER

## 2023-06-07 RX ORDER — LEVETIRACETAM 750 MG/1
750 TABLET ORAL 2 TIMES DAILY
Qty: 180 TABLET | Refills: 3 | Status: SHIPPED | OUTPATIENT
Start: 2023-06-07

## 2023-06-12 ENCOUNTER — OFFICE VISIT (OUTPATIENT)
Dept: INTERNAL MEDICINE CLINIC | Facility: CLINIC | Age: 58
End: 2023-06-12
Payer: COMMERCIAL

## 2023-06-12 VITALS
SYSTOLIC BLOOD PRESSURE: 102 MMHG | DIASTOLIC BLOOD PRESSURE: 68 MMHG | TEMPERATURE: 98 F | RESPIRATION RATE: 12 BRPM | HEART RATE: 80 BPM | WEIGHT: 102.88 LBS | BODY MASS INDEX: 19.94 KG/M2 | HEIGHT: 60.25 IN

## 2023-06-12 DIAGNOSIS — M81.0 AGE-RELATED OSTEOPOROSIS WITHOUT CURRENT PATHOLOGICAL FRACTURE: ICD-10-CM

## 2023-06-12 DIAGNOSIS — Z00.00 PHYSICAL EXAM, ANNUAL: Primary | ICD-10-CM

## 2023-06-12 DIAGNOSIS — Z12.31 ENCOUNTER FOR SCREENING MAMMOGRAM FOR MALIGNANT NEOPLASM OF BREAST: ICD-10-CM

## 2023-06-12 DIAGNOSIS — Z78.0 POSTMENOPAUSAL: ICD-10-CM

## 2023-06-14 ENCOUNTER — LAB ENCOUNTER (OUTPATIENT)
Dept: LAB | Age: 58
End: 2023-06-14
Attending: INTERNAL MEDICINE
Payer: COMMERCIAL

## 2023-06-14 DIAGNOSIS — Z00.00 PHYSICAL EXAM, ANNUAL: ICD-10-CM

## 2023-06-14 LAB
ALBUMIN SERPL-MCNC: 3.8 G/DL (ref 3.4–5)
ALBUMIN/GLOB SERPL: 1 {RATIO} (ref 1–2)
ALP LIVER SERPL-CCNC: 37 U/L
ALT SERPL-CCNC: 20 U/L
ANION GAP SERPL CALC-SCNC: 5 MMOL/L (ref 0–18)
AST SERPL-CCNC: 19 U/L (ref 15–37)
BASOPHILS # BLD AUTO: 0.11 X10(3) UL (ref 0–0.2)
BASOPHILS NFR BLD AUTO: 1.5 %
BILIRUB SERPL-MCNC: 0.5 MG/DL (ref 0.1–2)
BUN BLD-MCNC: 15 MG/DL (ref 7–18)
CALCIUM BLD-MCNC: 9.3 MG/DL (ref 8.5–10.1)
CHLORIDE SERPL-SCNC: 107 MMOL/L (ref 98–112)
CHOLEST SERPL-MCNC: 204 MG/DL (ref ?–200)
CO2 SERPL-SCNC: 29 MMOL/L (ref 21–32)
CREAT BLD-MCNC: 0.77 MG/DL
EOSINOPHIL # BLD AUTO: 0.23 X10(3) UL (ref 0–0.7)
EOSINOPHIL NFR BLD AUTO: 3.1 %
ERYTHROCYTE [DISTWIDTH] IN BLOOD BY AUTOMATED COUNT: 13.2 %
EST. AVERAGE GLUCOSE BLD GHB EST-MCNC: 123 MG/DL (ref 68–126)
FASTING PATIENT LIPID ANSWER: YES
FASTING STATUS PATIENT QL REPORTED: YES
GFR SERPLBLD BASED ON 1.73 SQ M-ARVRAT: 89 ML/MIN/1.73M2 (ref 60–?)
GLOBULIN PLAS-MCNC: 3.7 G/DL (ref 2.8–4.4)
GLUCOSE BLD-MCNC: 91 MG/DL (ref 70–99)
HBA1C MFR BLD: 5.9 % (ref ?–5.7)
HCT VFR BLD AUTO: 46.3 %
HDLC SERPL-MCNC: 71 MG/DL (ref 40–59)
HGB BLD-MCNC: 14.9 G/DL
IMM GRANULOCYTES # BLD AUTO: 0.02 X10(3) UL (ref 0–1)
IMM GRANULOCYTES NFR BLD: 0.3 %
LDLC SERPL CALC-MCNC: 113 MG/DL (ref ?–100)
LYMPHOCYTES # BLD AUTO: 2.18 X10(3) UL (ref 1–4)
LYMPHOCYTES NFR BLD AUTO: 29.7 %
MCH RBC QN AUTO: 28.6 PG (ref 26–34)
MCHC RBC AUTO-ENTMCNC: 32.2 G/DL (ref 31–37)
MCV RBC AUTO: 88.9 FL
MONOCYTES # BLD AUTO: 0.69 X10(3) UL (ref 0.1–1)
MONOCYTES NFR BLD AUTO: 9.4 %
NEUTROPHILS # BLD AUTO: 4.12 X10 (3) UL (ref 1.5–7.7)
NEUTROPHILS # BLD AUTO: 4.12 X10(3) UL (ref 1.5–7.7)
NEUTROPHILS NFR BLD AUTO: 56 %
NONHDLC SERPL-MCNC: 133 MG/DL (ref ?–130)
OSMOLALITY SERPL CALC.SUM OF ELEC: 292 MOSM/KG (ref 275–295)
PLATELET # BLD AUTO: 305 10(3)UL (ref 150–450)
POTASSIUM SERPL-SCNC: 4.9 MMOL/L (ref 3.5–5.1)
PROT SERPL-MCNC: 7.5 G/DL (ref 6.4–8.2)
RBC # BLD AUTO: 5.21 X10(6)UL
SODIUM SERPL-SCNC: 141 MMOL/L (ref 136–145)
T4 FREE SERPL-MCNC: 0.8 NG/DL (ref 0.8–1.7)
TRIGL SERPL-MCNC: 114 MG/DL (ref 30–149)
TSI SER-ACNC: 3.05 MIU/ML (ref 0.36–3.74)
VLDLC SERPL CALC-MCNC: 20 MG/DL (ref 0–30)
WBC # BLD AUTO: 7.4 X10(3) UL (ref 4–11)

## 2023-06-14 PROCEDURE — 83036 HEMOGLOBIN GLYCOSYLATED A1C: CPT | Performed by: INTERNAL MEDICINE

## 2023-06-14 PROCEDURE — 80050 GENERAL HEALTH PANEL: CPT | Performed by: INTERNAL MEDICINE

## 2023-06-14 PROCEDURE — 84439 ASSAY OF FREE THYROXINE: CPT | Performed by: INTERNAL MEDICINE

## 2023-06-14 PROCEDURE — 80061 LIPID PANEL: CPT | Performed by: INTERNAL MEDICINE

## 2023-06-19 ENCOUNTER — PATIENT MESSAGE (OUTPATIENT)
Dept: INTERNAL MEDICINE CLINIC | Facility: CLINIC | Age: 58
End: 2023-06-19

## 2023-06-19 NOTE — TELEPHONE ENCOUNTER
a1c was done as part of the screening labs ordered during the cpx. This result is an average of her glucoses over the past 3 months, whatever she has eaten will affect it but not acutely due to it being an average. Nothing more to do at this time other than low carb diet and regular exercise. The usual recommendation is 150 minutes of moderate cardiovascular exercise a week. The a1c will be monitored yearly.

## 2023-06-19 NOTE — TELEPHONE ENCOUNTER
From: Chari Whitley  To: Courtney Suarez MD  Sent: 6/19/2023 9:00 AM CDT  Subject: follow up from test results    Dear Dr. Carmella Emmanuel,  I received a phone call from your office regarding the blood work test results. I happened to be in a store at the time, but took the call any way. Essentially, I was told that I need to watch my diet (low sugar and fat) and exercise more. (I want you to know that I do watch my diet.) I have never had the A1C test. What made you decide to give me this test? I was also wondering how much did what I ate the week prior to the test affect the results? I know the results have fluctuated over the years as I have looked at past results and I saw that I am sometimes borderline. Is there anything else I need to know? My mammogram and Dexascan are scheduled in July.   Thank you,  Radha Harris

## 2023-07-15 ENCOUNTER — HOSPITAL ENCOUNTER (OUTPATIENT)
Dept: BONE DENSITY | Age: 58
Discharge: HOME | End: 2023-07-15
Attending: INTERNAL MEDICINE
Payer: COMMERCIAL

## 2023-07-15 ENCOUNTER — HOSPITAL ENCOUNTER (OUTPATIENT)
Dept: MAMMOGRAPHY | Age: 58
Discharge: HOME | End: 2023-07-15
Attending: INTERNAL MEDICINE
Payer: COMMERCIAL

## 2023-07-15 DIAGNOSIS — Z78.0 POSTMENOPAUSAL: ICD-10-CM

## 2023-07-15 DIAGNOSIS — Z12.31 ENCOUNTER FOR SCREENING MAMMOGRAM FOR MALIGNANT NEOPLASM OF BREAST: ICD-10-CM

## 2023-07-15 PROCEDURE — 77063 BREAST TOMOSYNTHESIS BI: CPT | Performed by: INTERNAL MEDICINE

## 2023-07-15 PROCEDURE — 77080 DXA BONE DENSITY AXIAL: CPT | Performed by: INTERNAL MEDICINE

## 2023-07-15 PROCEDURE — 77067 SCR MAMMO BI INCL CAD: CPT | Performed by: INTERNAL MEDICINE

## 2023-10-25 ENCOUNTER — PATIENT MESSAGE (OUTPATIENT)
Dept: INTERNAL MEDICINE CLINIC | Facility: CLINIC | Age: 58
End: 2023-10-25

## 2023-10-26 NOTE — TELEPHONE ENCOUNTER
From: Eden Whitley  To: Josh Austin  Sent: 10/25/2023 6:32 PM CDT  Subject: vaccine updates    Dr. Manda Rainey,  Just wanted you to know that I had the new covid shot [employer]) and the flu shot on October 9.   Thanks,  Bianka Araujo

## 2023-10-31 ENCOUNTER — OFFICE VISIT (OUTPATIENT)
Dept: ENDOCRINOLOGY CLINIC | Facility: CLINIC | Age: 58
End: 2023-10-31

## 2023-10-31 ENCOUNTER — TELEPHONE (OUTPATIENT)
Dept: ENDOCRINOLOGY CLINIC | Facility: CLINIC | Age: 58
End: 2023-10-31

## 2023-10-31 VITALS
SYSTOLIC BLOOD PRESSURE: 114 MMHG | WEIGHT: 103 LBS | BODY MASS INDEX: 20 KG/M2 | DIASTOLIC BLOOD PRESSURE: 65 MMHG | HEART RATE: 88 BPM

## 2023-10-31 DIAGNOSIS — M81.0 AGE-RELATED OSTEOPOROSIS WITHOUT CURRENT PATHOLOGICAL FRACTURE: Primary | ICD-10-CM

## 2023-10-31 DIAGNOSIS — E55.9 VITAMIN D DEFICIENCY: ICD-10-CM

## 2023-10-31 NOTE — TELEPHONE ENCOUNTER
Please start PA for prolia  T/f fosamax since BMD is still  low  Recently had dental work hence reclast will not be a good option       Thanks

## 2023-11-01 ENCOUNTER — LAB ENCOUNTER (OUTPATIENT)
Dept: LAB | Age: 58
End: 2023-11-01
Attending: INTERNAL MEDICINE
Payer: COMMERCIAL

## 2023-11-01 DIAGNOSIS — M81.0 AGE-RELATED OSTEOPOROSIS WITHOUT CURRENT PATHOLOGICAL FRACTURE: ICD-10-CM

## 2023-11-01 DIAGNOSIS — E55.9 VITAMIN D DEFICIENCY: ICD-10-CM

## 2023-11-01 LAB — VIT D+METAB SERPL-MCNC: 66.2 NG/ML (ref 30–100)

## 2023-11-01 PROCEDURE — 83970 ASSAY OF PARATHORMONE: CPT

## 2023-11-01 PROCEDURE — 82306 VITAMIN D 25 HYDROXY: CPT

## 2023-11-01 PROCEDURE — 36415 COLL VENOUS BLD VENIPUNCTURE: CPT

## 2023-11-01 PROCEDURE — 84080 ASSAY ALKALINE PHOSPHATASES: CPT

## 2023-11-01 NOTE — TELEPHONE ENCOUNTER
Medication PA Requested:  Denosumab (Prolia)                                                         CoverMyMeds Used:  Yes  Key:[id number]   Quantity: 1mL   Day Supply: 1 day  Sig: Inject 60mg into skin every 6 months  DX Code:   M81.0                                   CPT code (if applicable):   Case Number/Pending Ref#:       CMM submitted, LOV 10/31  Awaiting determination

## 2023-11-01 NOTE — TELEPHONE ENCOUNTER
Medication PA Requested:  Denosumab (Prolia)                                                         CoverMyMeds Used:  Key:  Quantity: 1mL   Day Supply: 1 day  Sig: Inject 60mg into skin every 6 months  DX Code:   M81.0                                   CPT code (if applicable):   Case Number/Pending Ref#:     T/f fosamax since BMD is still  low  Recently had dental work hence reclast will not be a good option

## 2023-11-02 LAB — PTH-INTACT SERPL-MCNC: 38.6 PG/ML (ref 18.5–88)

## 2023-11-07 LAB — ALKALINE PHOSPHATASE BONE SPECIFIC: 7.5 UG/L

## 2023-11-21 ENCOUNTER — HOSPITAL ENCOUNTER (OUTPATIENT)
Dept: ULTRASOUND IMAGING | Facility: HOSPITAL | Age: 58
Discharge: HOME | End: 2023-11-21
Attending: OTOLARYNGOLOGY
Payer: COMMERCIAL

## 2023-11-21 DIAGNOSIS — E07.9 THYROID DYSFUNCTION: ICD-10-CM

## 2023-11-21 DIAGNOSIS — E04.1 THYROID NODULE: ICD-10-CM

## 2023-11-21 PROCEDURE — 76536 US EXAM OF HEAD AND NECK: CPT | Performed by: OTOLARYNGOLOGY

## 2023-11-26 ENCOUNTER — PATIENT MESSAGE (OUTPATIENT)
Dept: ENDOCRINOLOGY CLINIC | Facility: CLINIC | Age: 58
End: 2023-11-26

## 2023-11-27 NOTE — TELEPHONE ENCOUNTER
Called [facility] at [phone], on hold for 1 hour and 6 minutes, spoke with Connor, no PA needed for , reference number [id number].

## 2023-11-27 NOTE — TELEPHONE ENCOUNTER
From: Inna Whitley  To: Blu Velez  Sent: 11/26/2023 9:07 PM CST  Subject: Prolia status    Dr Berrios Back,  I was wondering what the status was of getting approval from [facility] regarding getting the Prolia shot. I cannot afford the cost of this shot if it is not covered. I am also concerned about the side effects. How often do these occur? Also, since I am on seizure control medication will this shot impact its effectiveness? I have been seizure free since taking this medication over 12 years ago. Please let me know what is happening regarding the insurance request and my questions. Thank you very much.     Daniel Shahid

## 2023-11-29 NOTE — TELEPHONE ENCOUNTER
No previous submission through [employer]. Submitted Prolia IV via [employer]. Awaiting 3-5 days. Per message below NO PA needed.

## 2023-11-29 NOTE — TELEPHONE ENCOUNTER
Dr. Henrry Jennings, see patient's questions regarding side effects. See TE 10/31/23. No PA required, but waiting for capay information from [address].

## 2023-12-04 NOTE — TELEPHONE ENCOUNTER
PA Required: NO  Prolia OOP COST: 50%  Facility Fee: N/A  Admin Fee: 50%    Informed pt via [employer].

## 2023-12-07 ENCOUNTER — TELEPHONE (OUTPATIENT)
Dept: ENDOCRINOLOGY CLINIC | Facility: CLINIC | Age: 58
End: 2023-12-07

## 2023-12-07 NOTE — TELEPHONE ENCOUNTER
Pt states that Prolia requires a PA according to Aline Metz. Pt is requesting this to be started. Pt is requesting a call back, and gave permission to leave details on voicemail.   See 11/26 TE   Please call

## 2023-12-08 ENCOUNTER — TELEPHONE (OUTPATIENT)
Dept: ENDOCRINOLOGY CLINIC | Facility: CLINIC | Age: 58
End: 2023-12-08

## 2023-12-08 NOTE — TELEPHONE ENCOUNTER
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 Please call asap and states if able to LVM.

## 2023-12-08 NOTE — TELEPHONE ENCOUNTER
Patient calling states if able to return call at 9:50am please call, states as well to refer to message from [facility] sent 12/7/23.

## 2023-12-13 NOTE — TELEPHONE ENCOUNTER
Received SOB VIA FAX DATED ON 11/29/23     NO PA REQUIRED    OOPCOST;50%    FACILITY FEE;NA    ADMIN FEE;50%

## 2023-12-19 ENCOUNTER — NURSE ONLY (OUTPATIENT)
Dept: ENDOCRINOLOGY CLINIC | Facility: CLINIC | Age: 58
End: 2023-12-19
Payer: COMMERCIAL

## 2023-12-19 DIAGNOSIS — M81.0 AGE-RELATED OSTEOPOROSIS WITHOUT CURRENT PATHOLOGICAL FRACTURE: Primary | ICD-10-CM

## 2023-12-19 PROCEDURE — 96372 THER/PROPH/DIAG INJ SC/IM: CPT | Performed by: INTERNAL MEDICINE

## 2023-12-19 NOTE — PROGRESS NOTES
Patient presents to clinic today for Prolia (denosumab) 60 mg/mL in left upper arm. Patient tolerated well. Patient understands to return in 6 months for next injection and is scheduled to see Dr. Kaleb Bradley in June.

## 2024-06-10 DIAGNOSIS — G40.209 PARTIAL SYMPTOMATIC EPILEPSY WITH COMPLEX PARTIAL SEIZURES, NOT INTRACTABLE, WITHOUT STATUS EPILEPTICUS (HCC): ICD-10-CM

## 2024-06-10 RX ORDER — LEVETIRACETAM 750 MG/1
750 TABLET ORAL 2 TIMES DAILY
Qty: 180 TABLET | Refills: 1 | Status: SHIPPED | OUTPATIENT
Start: 2024-06-10

## 2024-06-10 NOTE — TELEPHONE ENCOUNTER
She needs a year supply to be sent to       [facility] - [facility], [place] - [address] [place] [address] [phone], [phone]

## 2024-06-10 NOTE — TELEPHONE ENCOUNTER
Medication: Levetiracetam 750 mg     Date of last refill: 06/07/2023 with 3 addt refills  [place]      Last office visit: 06/07/2023  Due back to clinic per last office note:  Date next office visit scheduled:    Future Appointments   Date Time Provider Department Center   6/14/2024 10:40 AM Brenda Crisostomo MD [facility] [place]   6/21/2024 10:00 AM Claudia Rodríguez MD [facility]   7/16/2024  9:40 AM Que Ely MD [facility]           Last OV note recommendation:    Discussion plus Diagnostics & Treatment Orders:  Renew [facility]iracetam to [facility]           (x) Discussed potential side effects of any treatment relevant to above.  Includes explanation of tests as necessary.     Return in about 1 year (around 6/7/2024).

## 2024-06-12 ENCOUNTER — LAB ENCOUNTER (OUTPATIENT)
Dept: LAB | Age: 59
End: 2024-06-12
Attending: INTERNAL MEDICINE
Payer: COMMERCIAL

## 2024-06-12 DIAGNOSIS — M81.0 AGE-RELATED OSTEOPOROSIS WITHOUT CURRENT PATHOLOGICAL FRACTURE: ICD-10-CM

## 2024-06-12 LAB — VIT D+METAB SERPL-MCNC: 74.1 NG/ML (ref 30–100)

## 2024-06-12 PROCEDURE — 36415 COLL VENOUS BLD VENIPUNCTURE: CPT

## 2024-06-12 PROCEDURE — 84080 ASSAY ALKALINE PHOSPHATASES: CPT

## 2024-06-12 PROCEDURE — 82306 VITAMIN D 25 HYDROXY: CPT

## 2024-06-14 ENCOUNTER — OFFICE VISIT (OUTPATIENT)
Dept: INTERNAL MEDICINE CLINIC | Facility: CLINIC | Age: 59
End: 2024-06-14
Payer: COMMERCIAL

## 2024-06-14 VITALS
WEIGHT: 105.31 LBS | SYSTOLIC BLOOD PRESSURE: 108 MMHG | DIASTOLIC BLOOD PRESSURE: 58 MMHG | HEART RATE: 76 BPM | TEMPERATURE: 98 F | BODY MASS INDEX: 20.14 KG/M2 | RESPIRATION RATE: 12 BRPM | HEIGHT: 60.5 IN

## 2024-06-14 DIAGNOSIS — Z12.11 SCREENING FOR COLON CANCER: ICD-10-CM

## 2024-06-14 DIAGNOSIS — Z12.31 ENCOUNTER FOR SCREENING MAMMOGRAM FOR MALIGNANT NEOPLASM OF BREAST: ICD-10-CM

## 2024-06-14 DIAGNOSIS — Z00.00 PHYSICAL EXAM, ANNUAL: Primary | ICD-10-CM

## 2024-06-14 DIAGNOSIS — B35.1 ONYCHOMYCOSIS: ICD-10-CM

## 2024-06-14 LAB — ALKALINE PHOSPHATASE BONE SPECIFIC: 6.1 UG/L

## 2024-06-14 PROCEDURE — 3078F DIAST BP <80 MM HG: CPT | Performed by: INTERNAL MEDICINE

## 2024-06-14 PROCEDURE — 99396 PREV VISIT EST AGE 40-64: CPT | Performed by: INTERNAL MEDICINE

## 2024-06-14 PROCEDURE — 3074F SYST BP LT 130 MM HG: CPT | Performed by: INTERNAL MEDICINE

## 2024-06-14 PROCEDURE — 3008F BODY MASS INDEX DOCD: CPT | Performed by: INTERNAL MEDICINE

## 2024-06-14 NOTE — PROGRESS NOTES
[facility]    CHIEF COMPLAINT:   Chief Complaint   Patient presents with    Routine Physical     6/30/22-normal pap, neg HPV.Done by Dr. ESCOBAR  7/15/23-mammo. 12/5/19-colon-repeat 5 years    Immunization/Injection     Declines tetanus today. Will get at pharm in August.          HPI:   Debbie Whitley is a 58 year old female who presents for a complete physical exam. Symptoms: denies discharge, itching, burning or dysuria.     Pap done 6/2022 negative with negative hpv.   Mammo done 7/2023.   Colonoscopy done 12/2019, repeat in 5 years. Due this year.   Dexa done 7/2023 showed osteoporosis. On prolia per endocrine.     Up to date covid booster, shingrix.   Due tdap this year. She will get this at her local pharmacy.     Exercise: walking    Derm: no concerning skin lesions or moles.     Wt Readings from Last 6 Encounters:   06/14/24 105 lb 4.8 oz (47.8 kg)   10/31/23 103 lb (46.7 kg)   06/12/23 102 lb 14.4 oz (46.7 kg)   06/07/23 95 lb (43.1 kg)   06/30/22 108 lb 6.4 oz (49.2 kg)   06/10/22 117 lb (53.1 kg)     Body mass index is 20.23 kg/m².       Current Outpatient Medications   Medication Sig Dispense Refill    levetiracetam 750 MG Oral Tab Take 1 tablet (750 mg total) by mouth 2 (two) times daily. 180 tablet 1    Cholecalciferol (VITAMIN D) 2000 units Oral Cap Take 1 capsule (2,000 Units total) by mouth daily. 30 capsule 0    Calcium Citrate-Vitamin D (CITRACAL + D OR) Take 2 tablets by mouth 2 (two) times daily.          Past Medical History:    Adenomatous polyp of sigmoid colon    Age-related osteoporosis without current pathological fracture    Blood in the stool    Constipation    Flatulence/gas pain/belching    Neurofibromatosis (HCC)    Osteopenia of spine    SEIZURE DISORDER    Seizures (HCC)    Thyroid disease    Wears glasses      Past Surgical History:   Procedure Laterality Date    Breast biopsy      Bilateral    Colonoscopy      Colonoscopy  11/2014    repeat in 5 yrs    D & c  2014    Eloina  localization wire 1 site left (cpt=19281)  2014    benign    Eloina localization wire 1 site right (cpt=19281)  2014    benign    Skin surgery  2014    biopsies of the skin       Family History   Problem Relation Age of Onset    Hypertension Father         Pace maker    Heart Attack Father     Hypertension Mother     Thyroid disease Mother         Hypothyroidism    Other (Other) Mother         polycythemia rubra vera    Thyroid disease Sister     Other (Alopecia) Sister     Breast Cancer Paternal Aunt         79      Social History:   Social History     Socioeconomic History    Marital status: Single   Occupational History    Occupation: teacher   Tobacco Use    Smoking status: Never    Smokeless tobacco: Never    Tobacco comments:     Never smoked   Vaping Use    Vaping status: Never Used   Substance and Sexual Activity    Alcohol use: Yes     Comment: 1 beer or wine on holidays    Drug use: No   Other Topics Concern    Caffeine Concern Yes     Comment: 1  or 2 cups of tea or soda daily    Stress Concern No    Weight Concern No    Special Diet No    Exercise Yes     Comment: walking    Seat Belt Yes     Comment: always wear seatbelt     : single.    Exercise:  exercises regularly .  Diet: watches calories closely     REVIEW OF SYSTEMS:   GENERAL: feels well otherwise  SKIN: denies any unusual skin lesions  EYES:denies blurred vision or double vision  HEENT: denies nasal congestion, sinus pain or ST  LUNGS: denies shortness of breath with exertion  CARDIOVASCULAR: denies chest pain on exertion  GI: denies abdominal pain,denies heartburn  : denies dysuria, vaginal discharge or itching  MUSCULOSKELETAL: denies back pain  NEURO: denies headaches  PSYCHE: denies depression or anxiety  HEMATOLOGIC: denies hx of anemia  ENDOCRINE: denies thyroid history  ALL/ASTHMA: denies hx of allergy or asthma    EXAM:   /58   Pulse 76   Temp 98.4 °F (36.9 °C) (Temporal)   Resp 12   Ht 5' 0.5\" (1.537 m)   Wt 105 lb 4.8  oz (47.8 kg)   LMP  (LMP Unknown)   Breastfeeding No   BMI 20.23 kg/m²   Body mass index is 20.23 kg/m².   GENERAL: well developed, well nourished,in no apparent distress  SKIN: no rashes,no suspicious lesions  HEENT: atraumatic, normocephalic,ears and throat are clear  EYES:PERRLA, conjunctiva are clear  NECK: supple,no adenopathy,no bruits  CHEST: no chest tenderness  LUNGS: clear to auscultation  CARDIO: nl s1 and s2, RRR without murmur  GI: good BS's,no masses, HSM or tenderness  BREAST: no dominant or suspicious mass, no nipple discharge  GENITAL/URINARY:  External Genitalia:  General appearance; normal, Hair distribution; normal, Lesions absent, Urethral Meatus:  Size normal, Location normal, Lesions absent, Prolapse absent, Vagina:  General appearance normal, Lesions absent, Pelvic support normal, Cervix:  General appearance normal, Discharge absent, Tenderness absent, Enlargement absent, Uterus:  Masses absent, Adnexa:  Masses absent, Tenderness absent, Anus/Perineum:  Lesions absent and Masses absent  No pap today.   MUSCULOSKELETAL: back is not tender,FROM of the back  EXTREMITIES: no cyanosis, clubbing or edema. Onychomycosis of bilateral big toes with nail dystrophy.   NEURO: Oriented times three,cranial nerves are intact,motor and sensory are grossly intact    Labs:   Lab Results   Component Value Date/Time    WBC 7.4 06/14/2023 07:28 AM    HGB 14.9 06/14/2023 07:28 AM    .0 06/14/2023 07:28 AM      Lab Results   Component Value Date/Time    GLU 91 06/14/2023 07:28 AM     06/14/2023 07:28 AM    K 4.9 06/14/2023 07:28 AM     06/14/2023 07:28 AM    CO2 29.0 06/14/2023 07:28 AM    CREATSERUM 0.77 06/14/2023 07:28 AM    CA 9.3 06/14/2023 07:28 AM    ALB 3.8 06/14/2023 07:28 AM    TP 7.5 06/14/2023 07:28 AM    ALKPHO 37 (L) 06/14/2023 07:28 AM    AST 19 06/14/2023 07:28 AM    ALT 20 06/14/2023 07:28 AM    BILT 0.5 06/14/2023 07:28 AM    TSH 3.050 06/14/2023 07:28 AM    T4F 0.8  06/14/2023 07:28 AM        Lab Results   Component Value Date/Time    CHOLEST 204 (H) 06/14/2023 07:28 AM    HDL 71 (H) 06/14/2023 07:28 AM    TRIG 114 06/14/2023 07:28 AM     (H) 06/14/2023 07:28 AM    NONHDLC 133 (H) 06/14/2023 07:28 AM       Lab Results   Component Value Date/Time    A1C 5.9 (H) 06/14/2023 07:28 AM      Vitamin D:    Lab Results   Component Value Date    VITD 74.1 06/12/2024           ASSESSMENT AND PLAN:   Debbie Whitley is a 58 year old female who presents for a complete physical exam.   1. Physical exam, annual  Do labs.   Pelvic and breast exam done.   Referral done for colonoscopy and mammo.   Immunizations discussed.   Continue regular exercise.   - CBC With Differential With Platelet; Future  - Comp Metabolic Panel; Future  - Lipid Panel; Future  - Hemoglobin A1C; Future  - TSH W Reflex To Free T4; Future    2. Screening for colon cancer  - Gastro Referral - In Network    3. Encounter for screening mammogram for malignant neoplasm of breast  - [facility] 2D+3D SCREENING BILAT (CPT=77067/43365); Future    4. Onychomycosis  See podiatry.   - Podiatry Referral - In Network        Return in about 1 year (around 6/14/2025) for physical.      Brenda Crisostomo MD

## 2024-06-17 ENCOUNTER — LAB ENCOUNTER (OUTPATIENT)
Dept: LAB | Age: 59
End: 2024-06-17
Attending: INTERNAL MEDICINE

## 2024-06-17 DIAGNOSIS — Z00.00 PHYSICAL EXAM, ANNUAL: ICD-10-CM

## 2024-06-17 LAB
ALBUMIN SERPL-MCNC: 3.9 G/DL (ref 3.4–5)
ALBUMIN/GLOB SERPL: 1.2 {RATIO} (ref 1–2)
ALP LIVER SERPL-CCNC: 24 U/L
ALT SERPL-CCNC: 22 U/L
ANION GAP SERPL CALC-SCNC: 3 MMOL/L (ref 0–18)
AST SERPL-CCNC: 16 U/L (ref 15–37)
BASOPHILS # BLD AUTO: 0.09 X10(3) UL (ref 0–0.2)
BASOPHILS NFR BLD AUTO: 1.5 %
BILIRUB SERPL-MCNC: 0.7 MG/DL (ref 0.1–2)
BUN BLD-MCNC: 14 MG/DL (ref 9–23)
CALCIUM BLD-MCNC: 9.3 MG/DL (ref 8.5–10.1)
CHLORIDE SERPL-SCNC: 111 MMOL/L (ref 98–112)
CHOLEST SERPL-MCNC: 202 MG/DL (ref ?–200)
CO2 SERPL-SCNC: 28 MMOL/L (ref 21–32)
CREAT BLD-MCNC: 0.94 MG/DL
EGFRCR SERPLBLD CKD-EPI 2021: 70 ML/MIN/1.73M2 (ref 60–?)
EOSINOPHIL # BLD AUTO: 0.21 X10(3) UL (ref 0–0.7)
EOSINOPHIL NFR BLD AUTO: 3.5 %
ERYTHROCYTE [DISTWIDTH] IN BLOOD BY AUTOMATED COUNT: 12.7 %
EST. AVERAGE GLUCOSE BLD GHB EST-MCNC: 117 MG/DL (ref 68–126)
FASTING PATIENT LIPID ANSWER: YES
FASTING STATUS PATIENT QL REPORTED: YES
GLOBULIN PLAS-MCNC: 3.3 G/DL (ref 2.8–4.4)
GLUCOSE BLD-MCNC: 96 MG/DL (ref 70–99)
HBA1C MFR BLD: 5.7 % (ref ?–5.7)
HCT VFR BLD AUTO: 44.6 %
HDLC SERPL-MCNC: 66 MG/DL (ref 40–59)
HGB BLD-MCNC: 14.3 G/DL
IMM GRANULOCYTES # BLD AUTO: 0.01 X10(3) UL (ref 0–1)
IMM GRANULOCYTES NFR BLD: 0.2 %
LDLC SERPL CALC-MCNC: 116 MG/DL (ref ?–100)
LYMPHOCYTES # BLD AUTO: 2.02 X10(3) UL (ref 1–4)
LYMPHOCYTES NFR BLD AUTO: 34.1 %
MCH RBC QN AUTO: 29.2 PG (ref 26–34)
MCHC RBC AUTO-ENTMCNC: 32.1 G/DL (ref 31–37)
MCV RBC AUTO: 91 FL
MONOCYTES # BLD AUTO: 0.63 X10(3) UL (ref 0.1–1)
MONOCYTES NFR BLD AUTO: 10.6 %
NEUTROPHILS # BLD AUTO: 2.97 X10 (3) UL (ref 1.5–7.7)
NEUTROPHILS # BLD AUTO: 2.97 X10(3) UL (ref 1.5–7.7)
NEUTROPHILS NFR BLD AUTO: 50.1 %
NONHDLC SERPL-MCNC: 136 MG/DL (ref ?–130)
OSMOLALITY SERPL CALC.SUM OF ELEC: 294 MOSM/KG (ref 275–295)
PLATELET # BLD AUTO: 289 10(3)UL (ref 150–450)
POTASSIUM SERPL-SCNC: 4.6 MMOL/L (ref 3.5–5.1)
PROT SERPL-MCNC: 7.2 G/DL (ref 6.4–8.2)
RBC # BLD AUTO: 4.9 X10(6)UL
SODIUM SERPL-SCNC: 142 MMOL/L (ref 136–145)
TRIGL SERPL-MCNC: 116 MG/DL (ref 30–149)
TSI SER-ACNC: 2.85 MIU/ML (ref 0.36–3.74)
VLDLC SERPL CALC-MCNC: 20 MG/DL (ref 0–30)
WBC # BLD AUTO: 5.9 X10(3) UL (ref 4–11)

## 2024-06-17 PROCEDURE — 80061 LIPID PANEL: CPT | Performed by: INTERNAL MEDICINE

## 2024-06-17 PROCEDURE — 80050 GENERAL HEALTH PANEL: CPT | Performed by: INTERNAL MEDICINE

## 2024-06-17 PROCEDURE — 83036 HEMOGLOBIN GLYCOSYLATED A1C: CPT | Performed by: INTERNAL MEDICINE

## 2024-06-20 ENCOUNTER — PATIENT MESSAGE (OUTPATIENT)
Dept: ENDOCRINOLOGY CLINIC | Facility: CLINIC | Age: 59
End: 2024-06-20

## 2024-06-20 ENCOUNTER — TELEPHONE (OUTPATIENT)
Dept: ENDOCRINOLOGY CLINIC | Facility: CLINIC | Age: 59
End: 2024-06-20

## 2024-06-20 DIAGNOSIS — M81.0 AGE-RELATED OSTEOPOROSIS WITHOUT CURRENT PATHOLOGICAL FRACTURE: ICD-10-CM

## 2024-06-20 NOTE — TELEPHONE ENCOUNTER
Patient calling states received a letter regards a lab order but states her primary  care provider ordered complete metabolic panel and all tests were done.

## 2024-06-20 NOTE — TELEPHONE ENCOUNTER
Received pt's Prolia SOB via [employer] **    PA Required: NO  Prolia OOP COST: 50%  Facility Fee:N/A   Admin Fee: 50%

## 2024-06-21 ENCOUNTER — PATIENT MESSAGE (OUTPATIENT)
Dept: ENDOCRINOLOGY CLINIC | Facility: CLINIC | Age: 59
End: 2024-06-21

## 2024-06-21 ENCOUNTER — OFFICE VISIT (OUTPATIENT)
Dept: ENDOCRINOLOGY CLINIC | Facility: CLINIC | Age: 59
End: 2024-06-21

## 2024-06-21 VITALS
BODY MASS INDEX: 20.12 KG/M2 | WEIGHT: 105.19 LBS | HEIGHT: 60.5 IN | DIASTOLIC BLOOD PRESSURE: 59 MMHG | HEART RATE: 78 BPM | SYSTOLIC BLOOD PRESSURE: 96 MMHG

## 2024-06-21 DIAGNOSIS — M81.0 AGE-RELATED OSTEOPOROSIS WITHOUT CURRENT PATHOLOGICAL FRACTURE: Primary | ICD-10-CM

## 2024-06-21 DIAGNOSIS — E55.9 VITAMIN D DEFICIENCY: ICD-10-CM

## 2024-06-21 PROCEDURE — 3074F SYST BP LT 130 MM HG: CPT | Performed by: INTERNAL MEDICINE

## 2024-06-21 PROCEDURE — 3008F BODY MASS INDEX DOCD: CPT | Performed by: INTERNAL MEDICINE

## 2024-06-21 PROCEDURE — 99213 OFFICE O/P EST LOW 20 MIN: CPT | Performed by: INTERNAL MEDICINE

## 2024-06-21 PROCEDURE — 3078F DIAST BP <80 MM HG: CPT | Performed by: INTERNAL MEDICINE

## 2024-06-21 PROCEDURE — 96372 THER/PROPH/DIAG INJ SC/IM: CPT | Performed by: INTERNAL MEDICINE

## 2024-06-21 NOTE — TELEPHONE ENCOUNTER
From: Debbie Whitley  To: Claudia Rodríguez  Sent: 6/20/2024 2:47 PM CDT  Subject: blood tests    Dr. Rodríguez,  I am sorry to repeat this but I received a notice today at 2:20 that all my tests were not done. I immediately called and told the woman who answered that all my tests were completed and I did not do the basic panel because another doctor had ordered the complete panel and you would have that to use for results. She has sent a message to your office. I will be in tomorrow at !0:00 and don't want to encounter any issues. Thank you for your patience and understanding. I just wanted you to know.  Debbie Whitley

## 2024-06-21 NOTE — PROGRESS NOTES
FU VISIT:     CHIEF COMPLAINT:    Osteoporosis     HISTORY OF PRESENT ILLNESS:   Debbie Whitley is a 58 year old female who presents for FU of Osteoporosis    Diagnosed on DXA in 2017  She started taking fosamax 70 mg weekly around that time  Has been compliant   She switched to prolia Dec 2023    No falls. No fractures: had a fall in 2014, fell from standing height , fractured a toe  Calcium / vitamin D supplementation: citracal 2 tabs BID; vit D 2000 units daily   Chronic pain medication/ antu seizure medication use: she has epilepsy( seizure free ) she takes keppra  H/o bariatric surgery malabsorption: denies    Smoking No  Excessive Alcohol intake No  History of thyroid disease Thyroid nodules  History of calcium problems: No   Menopause: Around age 46        H/o dental work: has crowns  H/o HA / strokes: denies  H/o renal stones denies  H/o heart burn: denies  H/o radiation: denies      PAST MEDICAL HISTORY:   Past Medical History:    Adenomatous polyp of sigmoid colon    Age-related osteoporosis without current pathological fracture    Blood in the stool    Constipation    Flatulence/gas pain/belching    Neurofibromatosis (HCC)    Osteopenia of spine    SEIZURE DISORDER    Seizures (HCC)    Thyroid disease    Wears glasses       PAST SURGICAL HISTORY:   Past Surgical History:   Procedure Laterality Date    Breast biopsy      Bilateral    Colonoscopy      Colonoscopy  11/2014    repeat in 5 yrs    D & c  2014    Eloina localization wire 1 site left (cpt=19281)  2014    benign    Eloina localization wire 1 site right (cpt=19281)  2014    benign    Skin surgery  2014    biopsies of the skin        CURRENT MEDICATIONS:    Current Outpatient Medications   Medication Sig Dispense Refill    Cholecalciferol (VITAMIN D) 2000 units Oral Cap Take 1 capsule (2,000 Units total) by mouth daily. 30 capsule 0    Calcium Citrate-Vitamin D (CITRACAL + D OR) Take 2 tablets by mouth 2 (two) times daily.        levetiracetam 750 MG Oral  Tab Take 1 tablet (750 mg total) by mouth 2 (two) times daily. 180 tablet 1       ALLERGIES:  Allergies   Allergen Reactions    Dander      Pet      Seasonal Runny nose       SOCIAL HISTORY:    Social History     Socioeconomic History    Marital status: Single   Occupational History    Occupation: teacher   Tobacco Use    Smoking status: Never    Smokeless tobacco: Never    Tobacco comments:     Never smoked   Vaping Use    Vaping status: Never Used   Substance and Sexual Activity    Alcohol use: Yes     Comment: 1 beer or wine on holidays    Drug use: No   Other Topics Concern    Caffeine Concern Yes     Comment: 1  or 2 cups of tea or soda daily    Stress Concern No    Weight Concern No    Special Diet No    Exercise Yes     Comment: walking    Seat Belt Yes     Comment: always wear seatbelt       FAMILY HISTORY:   Family History   Problem Relation Age of Onset    Hypertension Father         Pace maker    Heart Attack Father     Hypertension Mother     Thyroid disease Mother         Hypothyroidism    Other (Other) Mother         polycythemia rubra vera    Thyroid disease Sister     Other (Alopecia) Sister     Breast Cancer Paternal Aunt         79       ASSESSMENTS:     REVIEW OF SYSTEMS:  Constitutional: Negative for: weight change, fever, fatigue, cold/heat intolerance  Eyes: Negative for:  Visual changes, proptosis, blurring  ENT: Negative for:  dysphagia, neck swelling, dysphonia  Respiratory: Negative for:  dyspnea, cough  Cardiovascular: Negative for:  chest pain, palpitations, orthopnea  GI: Negative for:  abdominal pain, nausea, vomiting, diarrhea, constipation, bleeding  Neurology: Negative for: headache, numbness, weakness  Genito-Urinary: Negative for: dysuria, frequency  Psychiatric: Negative for:  depression, anxiety  Hematology/Lymphatics: Negative for: bruising, lower extremity edema  Endocrine: Negative for: polyuria, polydypsia  Skin: Negative for: rash, blister, cellulitis,      PHYSICAL EXAM:    Vitals:    06/21/24 0958   BP: 96/59   Pulse: 78   Weight: 105 lb 3.2 oz (47.7 kg)   Height: 5' 0.5\" (1.537 m)     BMI: Body mass index is 20.21 kg/m².         General Appearance:  alert, well developed, in no acute distress  Head: Atraumatic  Eyes:  normal conjunctivae, sclera., normal sclera and normal pupils  Throat/Neck: normal sound to voice. Normal hearing, normal speech  Respiratory:  Speaking in full sentences, non-labored. no increased work of breathing, no audible wheezing    Neuro: motor grossly intact, moving all extremities without difficulty  Psychiatric:  oriented to time, self, and place  Extremities: no obvious extremity swelling, no lesions        DATA:     Pertinent data reviewed      DXA 2023:   L spine - 2.7 Change - 2.9 %   Total hip - 2.3  Change - 4.3 %   FN - 2.4 Change 2.2 %     ASSESSMENT AND PLAN:      Patient is a 58 year old female with Osteoporosis      I  reviewed the following:   - Osteoporosis: pathogenesis and management  - Various agents for Osteoporosis management including anabolic and anti resorptive therapy discussed in a detail along with pros and cons , SE and CI    She has worsening BMD On DXA, switched to prolia in dec 2023    Doing well  Second injection today  Decrease vit D to 2000 units for 5 days a week       Prolia reviewed  Contraindications  Hypocalcemia   Pregnancy  Known hypersensitivity to Prolia    Warnings  Hypersensitivity including anaphylactic reactions may occur.   Hypocalcemia: Must be corrected before initiating Prolia. May worsen,   especially in patients with renal impairment. Adequately supplement   patients with calcium and vitamin D.  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  Contact the clinic if you develop new thigh or   groin pain to rule out an incomplete femoral fracture.  Multiple vertebral fractures have been reported following Prolia   discontinuation. Patients should be  transitioned to another antiresorptive   agent if Prolia is discontinued.   Serious infections including skin infections: May occur, including those   leading to hospitalization. Patients should seek prompt medical   attention if they develop signs or symptoms of infection, including   cellulitis.  Dermatologic reactions: Dermatitis, rashes, and eczema have been   reported  Severe bone, joint, muscle pain may occur  Suppression of bone turnover: Significant suppression has been   Demonstrated    Adverse reactions  Most common adverse reactions (> 5%   and more common than placebo) were: back pain, pain in extremity,  hypercholesterolemia, musculoskeletal pain, and cystitis        Diet: Eat foods with high calcium: milk with vitamin D added, fish from the ocean, yogurt, green leafy vegetables  Exercise:  lifelong physical activity at all ages is strongly endorsed by the National Osteoporosis Foundation. Exercise recommendations generally should include weight-bearing, muscle-strengthening, and balance training exercises for 30 minutes 5 days per week or 75 minutes twice weekly, often consistent with other general health recommendations. Weight-bearing exercises are activities that make the body move against gravity such as walking, jogging, dancing, tennis, and Javed Chi. To protect the spine in patients with low spinal bone density, maintaining a straight spine and avoiding arching and twisting are generally recommended. Fall precautions reviewed       She will FU with me 6 months after that     Orders Placed This Encounter   Procedures    Comp Metabolic Panel [E]    PTH, Intact    Alk Phosphatase, Bone Specific    Vitamin D [E]         Claudia Rodríguez MD        Patient verbalized a complete  understanding of all of the above and did not have any further questions.

## 2024-06-24 NOTE — TELEPHONE ENCOUNTER
MC sent advising patient that labs were complete for apt on 6/21/24  Patient advised to complete labs prior to apt on 12/23/24

## 2024-07-10 ENCOUNTER — OFFICE VISIT (OUTPATIENT)
Dept: PODIATRY CLINIC | Facility: CLINIC | Age: 59
End: 2024-07-10
Payer: COMMERCIAL

## 2024-07-10 DIAGNOSIS — L84 CALLUS OF FOOT: ICD-10-CM

## 2024-07-10 DIAGNOSIS — Q82.8 PUNCTATE POROKERATOSIS: ICD-10-CM

## 2024-07-10 DIAGNOSIS — M77.41 METATARSALGIA OF RIGHT FOOT: ICD-10-CM

## 2024-07-10 DIAGNOSIS — B35.1 ONYCHOMYCOSIS: Primary | ICD-10-CM

## 2024-07-10 PROCEDURE — 99203 OFFICE O/P NEW LOW 30 MIN: CPT | Performed by: PODIATRIST

## 2024-07-10 NOTE — PROGRESS NOTES
Debbie Whitley is a 58 year old female.   Chief Complaint   Patient presents with    New Patient     Patient is here for discoloration of nails on bilateral hallux. She does get callus and blister between toes. She rates discomfort at 1/10, does get numbness and tingling at times.         HPI:   Patient presents to the clinic she is got some painful calluses but her major complaint is of discoloration and loosening of her great toenails on both feet.  At today's visit reviewed nurse's history as taken above, allergies medications and medical history as documented below.  All changes duly noted  Allergies: Dander and Seasonal   Current Outpatient Medications   Medication Sig Dispense Refill    levetiracetam 750 MG Oral Tab Take 1 tablet (750 mg total) by mouth 2 (two) times daily. 180 tablet 1    Cholecalciferol (VITAMIN D) 2000 units Oral Cap Take 1 capsule (2,000 Units total) by mouth daily. 30 capsule 0    Calcium Citrate-Vitamin D (CITRACAL + D OR) Take 2 tablets by mouth 2 (two) times daily.          Past Medical History:    Adenomatous polyp of sigmoid colon    Age-related osteoporosis without current pathological fracture    Blood in the stool    Constipation    Flatulence/gas pain/belching    Neurofibromatosis (HCC)    Osteopenia of spine    SEIZURE DISORDER    Seizures (HCC)    Thyroid disease    Wears glasses      Past Surgical History:   Procedure Laterality Date    Breast biopsy      Bilateral    Colonoscopy      Colonoscopy  11/2014    repeat in 5 yrs    D & c  2014    Eloina localization wire 1 site left (cpt=19281)  2014    benign    Eloina localization wire 1 site right (cpt=19281)  2014    benign    Skin surgery  2014    biopsies of the skin       Family History   Problem Relation Age of Onset    Hypertension Father         Pace maker    Heart Attack Father     Hypertension Mother     Thyroid disease Mother         Hypothyroidism    Other (Other) Mother         polycythemia rubra vera    Thyroid disease  Sister     Other (Alopecia) Sister     Breast Cancer Paternal Aunt         79      Social History     Socioeconomic History    Marital status: Single   Occupational History    Occupation: teacher   Tobacco Use    Smoking status: Never    Smokeless tobacco: Never    Tobacco comments:     Never smoked   Vaping Use    Vaping status: Never Used   Substance and Sexual Activity    Alcohol use: Yes     Comment: 1 beer or wine on holidays    Drug use: No   Other Topics Concern    Caffeine Concern Yes     Comment: 1  or 2 cups of tea or soda daily    Stress Concern No    Weight Concern No    Special Diet No    Exercise Yes     Comment: walking    Seat Belt Yes     Comment: always wear seatbelt           REVIEW OF SYSTEMS:   Today reviewed systens as documented below  GENERAL HEALTH: feels well otherwise  SKIN: Refer to exam below  RESPIRATORY: denies shortness of breath with exertion  CARDIOVASCULAR: denies chest pain on exertion  GI: denies abdominal pain and denies heartburn  NEURO: denies headaches    EXAM:   LMP  (LMP Unknown)   GENERAL: well developed, well nourished, in no apparent distress  EXTREMITIES:   1. Integument: Skin on her feet is warm and dry she has hyperkeratosis underneath the fourth metatarsal head of the right foot and a couple of small punctate porokeratosis 1 on the distal tip of the right hallux one just proximal to the second metatarsal head both again on the right foot.  She has plantar neurofibromatosis that she has a couple of small lesions on the digits as well.   2. Vascular: Patient has palpable pulses both dorsalis pedis and posterior tibial bilateral   3. Neurologic: Patient has intact sensorium in her   4. Musculoskeletal: Patient has good muscle strength and is ambulatory.    ASSESSMENT AND PLAN:   Diagnoses and all orders for this visit:    Onychomycosis  -     Dermatophyte Only, Culture [E]; Future    Metatarsalgia of right foot    Callus of foot    Punctate porokeratosis        Plan:  Today using a nail nippers were trimmed and debrided toenails 1-5 manually and mechanically in girth and width as far down to healthy tissue as possible on both feet.  This was done uneventfully there was no hemorrhage.  There is mild incurvation of the medial and lateral nail borders of the hallux which using a slant back technique were removed and they would not get ingrown.   A clipping was taken the left hallux nail will be sent for fungal culture she was told to take 3 to 4 weeks to get.  Using a 15 blade trimmed down debrided hyperkeratoses.  All areas were smoothed with either a sanding disc or bur.  Today I discussed different treatments for the fungal nails including topicals Lamisil tablet therapy routine trimming and filing.  I discussed with her the differences between the topical which is not as effective as Lamisil tablets which are the most effective.  The nature and extent of the medication was reviewed with her possible risks up to and including liver failure were all reviewed but I did tell her that we monitor liver enzymes before we start and again 6 weeks into therapy and asked her to consider she will talk to her other doctors and see if Lamisil tablets have any interference with the current medications that she is.  Follow-up after we get culture results.    The patient indicates understanding of these issues and agrees to the plan.    Deven Muse DPM

## 2024-07-15 ENCOUNTER — HOSPITAL ENCOUNTER (OUTPATIENT)
Dept: MAMMOGRAPHY | Age: 59
Discharge: HOME | End: 2024-07-15
Attending: INTERNAL MEDICINE
Payer: COMMERCIAL

## 2024-07-15 DIAGNOSIS — Z12.31 ENCOUNTER FOR SCREENING MAMMOGRAM FOR MALIGNANT NEOPLASM OF BREAST: ICD-10-CM

## 2024-07-15 PROCEDURE — 77063 BREAST TOMOSYNTHESIS BI: CPT | Performed by: INTERNAL MEDICINE

## 2024-07-15 PROCEDURE — 77067 SCR MAMMO BI INCL CAD: CPT | Performed by: INTERNAL MEDICINE

## 2024-07-16 ENCOUNTER — OFFICE VISIT (OUTPATIENT)
Dept: NEUROLOGY | Facility: CLINIC | Age: 59
End: 2024-07-16
Payer: COMMERCIAL

## 2024-07-16 VITALS
HEART RATE: 72 BPM | RESPIRATION RATE: 16 BRPM | HEIGHT: 60.5 IN | SYSTOLIC BLOOD PRESSURE: 104 MMHG | DIASTOLIC BLOOD PRESSURE: 64 MMHG | BODY MASS INDEX: 20.08 KG/M2 | WEIGHT: 105 LBS

## 2024-07-16 DIAGNOSIS — G40.209 PARTIAL SYMPTOMATIC EPILEPSY WITH COMPLEX PARTIAL SEIZURES, NOT INTRACTABLE, WITHOUT STATUS EPILEPTICUS (HCC): ICD-10-CM

## 2024-07-16 DIAGNOSIS — Q85.00 NEUROFIBROMATOSIS (HCC): Primary | ICD-10-CM

## 2024-07-16 PROCEDURE — 3074F SYST BP LT 130 MM HG: CPT | Performed by: OTHER

## 2024-07-16 PROCEDURE — 3008F BODY MASS INDEX DOCD: CPT | Performed by: OTHER

## 2024-07-16 PROCEDURE — 3078F DIAST BP <80 MM HG: CPT | Performed by: OTHER

## 2024-07-16 PROCEDURE — 99214 OFFICE O/P EST MOD 30 MIN: CPT | Performed by: OTHER

## 2024-07-16 RX ORDER — LEVETIRACETAM 750 MG/1
750 TABLET ORAL 2 TIMES DAILY
Qty: 180 TABLET | Refills: 3 | Status: SHIPPED | OUTPATIENT
Start: 2024-07-16

## 2024-07-16 NOTE — PROGRESS NOTES
NEUROLOGY  [facility]       Debbie Whitley  8/10/1965  Primary Care Provider:  Brenda Crisostomo MD    7/16/2024  58 year old yo,  was last seen on:: 1 year ago    Seen for/plans last visit:  1. Neurofibromatosis (HCC)    2. Partial symptomatic epilepsy with complex partial seizures, not intractable, without status epilepticus (HCC)          Previous visit and existing record notes reviewed in preparation for the face to face visit.  Relevant labs and studies reviewed and will be noted in relevant areas of this record.  Accompanied visit:     (x) No.      Present condition:  No seizures to report  No SE  NF feels like there might be a new lump in left eyelid (Upper)    Past History update/new problem(s): no new problem    Review of Systems:  Review of Systems:  Denies systemic symptoms     No CP or SOB.  No GI or  symptoms. Relevant Neuro as noted above.      Medications:      Current Outpatient Medications:     levetiracetam 750 MG Oral Tab, Take 1 tablet (750 mg total) by mouth 2 (two) times daily., Disp: 180 tablet, Rfl: 3    Cholecalciferol (VITAMIN D) 2000 units Oral Cap, Take 1 capsule (2,000 Units total) by mouth daily., Disp: 30 capsule, Rfl: 0    Calcium Citrate-Vitamin D (CITRACAL + D OR), Take 2 tablets by mouth 2 (two) times daily.  , Disp: , Rfl:   PRN:     Allergies:  Allergies   Allergen Reactions    Dander      Pet      Seasonal Runny nose          EXAM:  /64 (BP Location: Left arm, Patient Position: Sitting, Cuff Size: adult)   Pulse 72   Resp 16   Ht 60.5\"   Wt 105 lb (47.6 kg)   LMP  (LMP Unknown)   BMI 20.17 kg/m²   Looks stated age  General Exam:  HENT:  pink conjunctiva anicteric sclerae  Neck no adenopathy, thyroid enlarged diffusely  Heart and Lungs:  normal  Extremities: no cyanosis  Typical Neurofibromatosis lesions    NEURO  MS normal  CN normal  Motor and sensory no lateralizing   DTRs hypo  Cerebellar and gait normal        INTERPRETATION of RELEVANT LABS  and other DATA:          Problem/s Identified this visit:   1. Neurofibromatosis (HCC)    2. Partial symptomatic epilepsy with complex partial seizures, not intractable, without status epilepticus (HCC)          Discussion plus Diagnostics & Treatment Orders:  Stable from seizure standpoint  Addressed her concerns about NF lesions in face - no discomfort but cosmetically she is considering having one in the left forehead removed    Orders Placed This Encounter    levetiracetam 750 MG Oral Tab     Sig: Take 1 tablet (750 mg total) by mouth 2 (two) times daily.     Dispense:  180 tablet     Refill:  3       (x) Discussed potential side effects of any treatment relevant to above.  Includes explanation of tests as necessary.  1 year follow up      Patient understands that if needed, based on condition and or test results, follow up will be readjusted      Que Ely MD  Vascular & General Neurology  Director, Multiple Sclerosis Program  [facility]  7/16/2024, Time completed 10:06 AM    Decision making:  ( x ) labs reviewed/ordered - 1  (  ) new diagnosis: - 1  ( x) Images & studies independently reviewed -non F2F  (  ) Case/studies discussed with other caregivers - -non F2F  (  ) Telephone time with patiern or authorized [facility] member--non F2F  ( x ) other records reviewed --non F2F including consultations  (  ) [facility] meetings - patient not present --non F2F  (  ) Independent Historian obtained    Non Face to Face CPT code 63684/89807 applies as documented above    PROCEDURE DONE     (   ) see notes        After visit, patient was escorted out and handed-off discharge process and instructions to the check out desk.  No additional issues relevant to visit were raised to staff at this time interval.        This document is to be interpreted as my current opinion regarding the case as of the stated date of service based on the information available to me at this time and may supersedes any prior opinion  expressed either orally or in writing.  Services rendered are only within the scope of direct medical care  Sometimes, reports may have been prepared partially using a speech recognition software technology.  If a word or phrase is confusing or out of context, please do not hesitate to call for clarification.

## 2024-07-16 NOTE — PATIENT INSTRUCTIONS
Refill policies:    Allow 2-3 business days for refills; controlled substances may take longer.  Contact your pharmacy at least 5 days prior to running out of medication and have them send an electronic request or submit request through the “request refill” option in your [employer] account.  Refills are not addressed on weekends; covering physicians do not authorize routine medications on weekends.  No narcotics or controlled substances are refilled after noon on Fridays or by on call physicians.  By law, narcotics must be electronically prescribed.  A 30 day supply with no refills is the maximum allowed.  If your prescription is due for a refill, you may be due for a follow up appointment.  To best provide you care, patients receiving routine medications need to be seen at least once a year.  Patients receiving narcotic/controlled substance medications need to be seen at least once every 3 months.  In the event that your preferred pharmacy does not have the requested medication in stock (e.g. Backordered), it is your responsibility to find another pharmacy that has the requested medication available.  We will gladly send a new prescription to that pharmacy at your request.    Scheduling Tests:    If your physician has ordered radiology tests such as MRI or CT scans, please contact Central Scheduling at [phone] right away to schedule the test.  Once scheduled, the [facility] Centralized Referral Team will work with your insurance carrier to obtain pre-certification or prior authorization.  Depending on your insurance carrier, approval may take 3-10 days.  It is highly recommended patients assure they have received an authorization before having a test performed.  If test is done without insurance authorization, patient may be responsible for the entire amount billed.      Precertification and Prior Authorizations:  If your physician has recommended that you have a procedure or additional testing performed the [facility]  Centralized Referral Team will contact your insurance carrier to obtain pre-certification or prior authorization.    You are strongly encouraged to contact your insurance carrier to verify that your procedure/test has been approved and is a COVERED benefit.  Although the [facility] Centralized Referral Team does its due diligence, the insurance carrier gives the disclaimer that \"Although the procedure is authorized, this does not guarantee payment.\"    Ultimately the patient is responsible for payment.   Thank you for your understanding in this matter.  Paperwork Completion:  If you require FMLA or disability paperwork for your recovery, please make sure to either drop it off or have it faxed to our office at [phone]. Be sure the form has your name and date of birth on it.  The form will be faxed to our Forms Department and they will complete it for you.  There is a 25$ fee for all forms that need to be filled out.  Please be aware there is a 10-14 day turnaround time.  You will need to sign a release of information (ZAHRA) form if your paperwork does not come with one.  You may call the Forms Department with any questions at [phone].  Their fax number is [phone].

## 2024-07-18 ENCOUNTER — LAB ENCOUNTER (OUTPATIENT)
Dept: LAB | Age: 59
End: 2024-07-18
Attending: PODIATRIST
Payer: COMMERCIAL

## 2024-07-18 ENCOUNTER — TELEPHONE (OUTPATIENT)
Dept: PODIATRY CLINIC | Facility: CLINIC | Age: 59
End: 2024-07-18

## 2024-07-18 DIAGNOSIS — B35.1 ONYCHOMYCOSIS: Primary | ICD-10-CM

## 2024-07-18 DIAGNOSIS — B35.1 ONYCHOMYCOSIS: ICD-10-CM

## 2024-07-18 LAB
ALBUMIN SERPL-MCNC: 4.4 G/DL (ref 3.2–4.8)
ALP LIVER SERPL-CCNC: 30 U/L
ALT SERPL-CCNC: <7 U/L
AST SERPL-CCNC: 20 U/L (ref ?–34)
BILIRUB DIRECT SERPL-MCNC: 0.2 MG/DL (ref ?–0.3)
BILIRUB SERPL-MCNC: 0.5 MG/DL (ref 0.3–1.2)
PROT SERPL-MCNC: 7.4 G/DL (ref 5.7–8.2)

## 2024-07-18 PROCEDURE — 36415 COLL VENOUS BLD VENIPUNCTURE: CPT

## 2024-07-18 PROCEDURE — 80076 HEPATIC FUNCTION PANEL: CPT

## 2024-07-18 NOTE — TELEPHONE ENCOUNTER
----- Message from Deven Muse sent at 7/16/2024 10:04 AM CDT -----  Results reviewed.  Please inform patient that fungal culture results are positive and we should proceed with Lamisil tablet therapy.  If the patient agrees we have to do a hepatic function panel, please place that order for me with a diagnosis of onychomycosis.

## 2024-07-19 RX ORDER — TERBINAFINE HYDROCHLORIDE 250 MG/1
250 TABLET ORAL DAILY
Qty: 45 TABLET | Refills: 0 | Status: SHIPPED | OUTPATIENT
Start: 2024-07-19

## 2024-07-19 NOTE — TELEPHONE ENCOUNTER
----- Message from Deven Muse sent at 7/18/2024  5:11 PM CDT -----  Hepatic function panel is normal please call in the following prescription:   Lamisil 250 mg tablets  Dispense 45, no refill  Instructions take 1 tablet daily p.o.  Then please tell the patient to follow-up in 6 weeks for reevaluation repeat testing thank you

## 2024-07-19 NOTE — TELEPHONE ENCOUNTER
Spoke to patient and relayed message as shown below. Patient verbalized understanding. Script sent.

## 2024-07-22 ENCOUNTER — TELEPHONE (OUTPATIENT)
Dept: PODIATRY CLINIC | Facility: CLINIC | Age: 59
End: 2024-07-22

## 2024-07-22 NOTE — TELEPHONE ENCOUNTER
Patient asking when to start taking the Lamisil. Patient states she is supposed to take it for 45 days and have a 6 week follow up, and patient has appointment scheduled for 9/9. Patient wants to know which specific day to start medication. See patient message from 7/19. Please reply to [employer] message or call.

## 2024-07-22 NOTE — TELEPHONE ENCOUNTER
Spoke with patient and let her know that we will had sent a note to MD about ordering new hepatic panel. Advised that that was the part of the equation that was more time-sensitive as far as getting the next round of treatment. Has follow up in correct time frame. Verbalized understanding.

## 2024-08-13 ENCOUNTER — PATIENT MESSAGE (OUTPATIENT)
Dept: ENDOCRINOLOGY CLINIC | Facility: CLINIC | Age: 59
End: 2024-08-13

## 2024-08-13 NOTE — TELEPHONE ENCOUNTER
From: Debbie Whitley  To: Claudia Rodríguez  Sent: 8/13/2024 8:43 AM CDT  Subject: Prolia and dental work    Dr. Rodríguez,  I was at the dentist yesterday. My dentist said that I need a crown. Should I be concerned about having this done? It will need to be done sometime soon. I understand that there can be concerns about Prolia and dental work. He advised me to ask you. Thank you.    Debbie Whitley

## 2024-08-19 ENCOUNTER — PATIENT MESSAGE (OUTPATIENT)
Dept: INTERNAL MEDICINE CLINIC | Facility: CLINIC | Age: 59
End: 2024-08-19

## 2024-08-20 NOTE — TELEPHONE ENCOUNTER
From: Debbie Whitley  To: Brenda Crisostomo  Sent: 8/19/2024 4:01 PM CDT  Subject: vaccine    Dr. Crisostomo,  Today, August 19, I got my T-dap vaccine at [facility]. They don't have your info so they won't send you anything, but I wanted you to know for my records.    Thank you,  Debbie Whitley

## 2024-09-05 ENCOUNTER — TELEPHONE (OUTPATIENT)
Dept: PODIATRY CLINIC | Facility: CLINIC | Age: 59
End: 2024-09-05

## 2024-09-05 ENCOUNTER — LAB ENCOUNTER (OUTPATIENT)
Dept: LAB | Age: 59
End: 2024-09-05
Attending: PODIATRIST
Payer: COMMERCIAL

## 2024-09-05 DIAGNOSIS — B35.1 ONYCHOMYCOSIS: ICD-10-CM

## 2024-09-05 DIAGNOSIS — B35.1 ONYCHOMYCOSIS: Primary | ICD-10-CM

## 2024-09-05 LAB
ALBUMIN SERPL-MCNC: 4.4 G/DL (ref 3.2–4.8)
ALP LIVER SERPL-CCNC: 25 U/L
ALT SERPL-CCNC: 8 U/L
AST SERPL-CCNC: 19 U/L (ref ?–34)
BILIRUB DIRECT SERPL-MCNC: 0.1 MG/DL (ref ?–0.3)
BILIRUB SERPL-MCNC: 0.3 MG/DL (ref 0.2–1.1)
PROT SERPL-MCNC: 7.3 G/DL (ref 5.7–8.2)

## 2024-09-05 PROCEDURE — 80076 HEPATIC FUNCTION PANEL: CPT

## 2024-09-05 PROCEDURE — 36415 COLL VENOUS BLD VENIPUNCTURE: CPT

## 2024-09-05 NOTE — TELEPHONE ENCOUNTER
S/w Dr Muse- stated to placed hepatic order with diagnosis of onychomycosis.    Order placed and mychart sent to patient in 9/4/24 encounter

## 2024-09-05 NOTE — TELEPHONE ENCOUNTER
Pt message 9-4-24.  Pt has an appointment on Monday 9-9-24.  Advised to have a blood test prior to appointment.  Liver function.   Pt needs an order.  Please call pt, leave a message

## 2024-09-09 ENCOUNTER — OFFICE VISIT (OUTPATIENT)
Dept: PODIATRY CLINIC | Facility: CLINIC | Age: 59
End: 2024-09-09
Payer: COMMERCIAL

## 2024-09-09 DIAGNOSIS — B35.1 ONYCHOMYCOSIS: Primary | ICD-10-CM

## 2024-09-09 PROCEDURE — 99213 OFFICE O/P EST LOW 20 MIN: CPT | Performed by: PODIATRIST

## 2024-09-09 RX ORDER — TERBINAFINE HYDROCHLORIDE 250 MG/1
250 TABLET ORAL DAILY
Qty: 45 TABLET | Refills: 0 | Status: SHIPPED | OUTPATIENT
Start: 2024-09-09

## 2024-09-11 NOTE — PROGRESS NOTES
Debbie Whitley is a 59 year old female.   Chief Complaint   Patient presents with    Follow - Up     Nail fungus follow up         HPI:   Patient returns to clinic for follow-up on fungal nail treatment.  At today's visit reviewed nurse's history as taken above, allergies medications and medical history as documented below.  All changes duly noted  Allergies: Dander and Seasonal   Current Outpatient Medications   Medication Sig Dispense Refill    terbinafine (LAMISIL) 250 MG Oral Tab Take 1 tablet (250 mg total) by mouth daily. 45 tablet 0    terbinafine 250 MG Oral Tab Take 1 tablet (250 mg total) by mouth daily. 45 tablet 0    levetiracetam 750 MG Oral Tab Take 1 tablet (750 mg total) by mouth 2 (two) times daily. 180 tablet 3    Cholecalciferol (VITAMIN D) 2000 units Oral Cap Take 1 capsule (2,000 Units total) by mouth daily. 30 capsule 0    Calcium Citrate-Vitamin D (CITRACAL + D OR) Take 2 tablets by mouth 2 (two) times daily.          Past Medical History:    Adenomatous polyp of sigmoid colon    Age-related osteoporosis without current pathological fracture    Blood in the stool    Constipation    Flatulence/gas pain/belching    Neurofibromatosis (HCC)    Osteopenia of spine    SEIZURE DISORDER    Seizures (HCC)    Thyroid disease    Wears glasses      Past Surgical History:   Procedure Laterality Date    Breast biopsy      Bilateral    Colonoscopy      Colonoscopy  11/2014    repeat in 5 yrs    D & c  2014    Eloina localization wire 1 site left (cpt=19281)  2014    benign    Eloina localization wire 1 site right (cpt=19281)  2014    benign    Skin surgery  2014    biopsies of the skin       Family History   Problem Relation Age of Onset    Hypertension Father         Pace maker    Heart Attack Father     Hypertension Mother     Thyroid disease Mother         Hypothyroidism    Other (Other) Mother         polycythemia rubra vera    Thyroid disease Sister     Other (Alopecia) Sister     Breast Cancer Paternal  Aunt         79      Social History     Socioeconomic History    Marital status: Single   Occupational History    Occupation: teacher   Tobacco Use    Smoking status: Never    Smokeless tobacco: Never    Tobacco comments:     Never smoked   Vaping Use    Vaping status: Never Used   Substance and Sexual Activity    Alcohol use: Yes     Comment: 1 beer or wine on holidays    Drug use: No   Other Topics Concern    Caffeine Concern Yes     Comment: 1  or 2 cups of tea or soda daily    Stress Concern No    Weight Concern No    Special Diet No    Exercise Yes     Comment: walking    Seat Belt Yes     Comment: always wear seatbelt           REVIEW OF SYSTEMS:   Today reviewed systens as documented below  GENERAL HEALTH: feels well otherwise  SKIN: Refer to exam below  RESPIRATORY: denies shortness of breath with exertion  CARDIOVASCULAR: denies chest pain on exertion  GI: denies abdominal pain and denies heartburn  NEURO: denies headaches    EXAM:   LMP  (LMP Unknown)   GENERAL: well developed, well nourished, in no apparent distress  EXTREMITIES:   1. Integument: Skin was evaluated there is lines of demarcation and all of the affected toenails.  She is responding well to the Lamisil tablets no desquamation noted.   2. Vascular: Patient has palpable pulses   3. Neurologic: Patient has intact sensorium no deficits are noted   4. Musculoskeletal: Has good muscle strength and is ambulatory.    ASSESSMENT AND PLAN:   Diagnoses and all orders for this visit:    Onychomycosis  -     terbinafine (LAMISIL) 250 MG Oral Tab; Take 1 tablet (250 mg total) by mouth daily.        Plan: Most recent hepatic function panel shows normal values prescribed the second round of Lamisil tablets follow-up in 2 months.    The patient indicates understanding of these issues and agrees to the plan.    Deven Muse DPM

## 2024-09-30 ENCOUNTER — PATIENT MESSAGE (OUTPATIENT)
Dept: INTERNAL MEDICINE CLINIC | Facility: CLINIC | Age: 59
End: 2024-09-30

## 2024-09-30 NOTE — TELEPHONE ENCOUNTER
From: Debbie Whitley  To: Brenda Crisostomo  Sent: 9/30/2024 4:55 PM CDT  Subject: vaccines    Dr. Crisostomo,    I got the updated Covid Vaccine and my flu shot on Saturday, Sep. 28 at my [facility] in [place]. I wanted you to know for my records. Thank you.    Debbie Whitley

## 2024-11-11 ENCOUNTER — OFFICE VISIT (OUTPATIENT)
Dept: PODIATRY CLINIC | Facility: CLINIC | Age: 59
End: 2024-11-11
Payer: COMMERCIAL

## 2024-11-11 DIAGNOSIS — B35.1 ONYCHOMYCOSIS: Primary | ICD-10-CM

## 2024-11-11 PROCEDURE — 99213 OFFICE O/P EST LOW 20 MIN: CPT | Performed by: PODIATRIST

## 2024-11-18 NOTE — PROGRESS NOTES
Debbie Whitley is a 59 year old female.   Chief Complaint   Patient presents with    Toenail Care     59 year old female who is unable to trim her own nails. They are long and thick causing discomfort when weightbearing.         HPI:   Patient returns to clinic for follow-up on fungal nail treatment.  She cannot trim her own toenails and is requesting that they be trimmed because they become elongated thickened and are painful with weightbearing.  At today's visit reviewed nurse's history as taken above, allergies medications and medical history as documented below.  All changes duly noted  Allergies: Dander and Seasonal   Current Outpatient Medications   Medication Sig Dispense Refill    levetiracetam 750 MG Oral Tab Take 1 tablet (750 mg total) by mouth 2 (two) times daily. 180 tablet 3    Cholecalciferol (VITAMIN D) 2000 units Oral Cap Take 1 capsule (2,000 Units total) by mouth daily. 30 capsule 0    Calcium Citrate-Vitamin D (CITRACAL + D OR) Take 2 tablets by mouth 2 (two) times daily.        PEG 3350-KCl-Na Bicarb-NaCl 420 g Oral Recon Soln Take as directed by physician (Patient not taking: Reported on 11/11/2024) 4000 mL 0      Past Medical History:    Adenomatous polyp of sigmoid colon    Age-related osteoporosis without current pathological fracture    Blood in the stool    Constipation    Flatulence/gas pain/belching    Neurofibromatosis (HCC)    Osteopenia of spine    SEIZURE DISORDER    Seizures (HCC)    Thyroid disease    Wears glasses      Past Surgical History:   Procedure Laterality Date    Breast biopsy      Bilateral    Colonoscopy      Colonoscopy  11/2014    repeat in 5 yrs    D & c  2014    Eloina localization wire 1 site left (cpt=19281)  2014    benign    Eloina localization wire 1 site right (cpt=19281)  2014    benign    Skin surgery  2014    biopsies of the skin       Family History   Problem Relation Age of Onset    Hypertension Father         Pace maker    Heart Attack Father      Hypertension Mother     Thyroid disease Mother         Hypothyroidism    Other (Other) Mother         polycythemia rubra vera    Thyroid disease Sister     Other (Alopecia) Sister     Breast Cancer Paternal Aunt         79      Social History     Socioeconomic History    Marital status: Single   Occupational History    Occupation: teacher   Tobacco Use    Smoking status: Never    Smokeless tobacco: Never    Tobacco comments:     Never smoked   Vaping Use    Vaping status: Never Used   Substance and Sexual Activity    Alcohol use: Yes     Comment: 1 beer or wine on holidays    Drug use: No   Other Topics Concern    Caffeine Concern Yes     Comment: 1  or 2 cups of tea or soda daily    Stress Concern No    Weight Concern No    Special Diet No    Exercise Yes     Comment: walking    Seat Belt Yes     Comment: always wear seatbelt           REVIEW OF SYSTEMS:   Today reviewed systens as documented below  GENERAL HEALTH: feels well otherwise  SKIN: Refer to exam below  RESPIRATORY: denies shortness of breath with exertion  CARDIOVASCULAR: denies chest pain on exertion  GI: denies abdominal pain and denies heartburn  NEURO: denies headaches    EXAM:   LMP  (LMP Unknown)   GENERAL: well developed, well nourished, in no apparent distress  EXTREMITIES:   1. Integument: Skin was evaluated there is lines of demarcation and all of the affected toenails.  She is responding well to the Lamisil tablets no desquamation noted.  All affected toenails have a very nice line of demarcation.   2. Vascular: Patient has palpable pulses   3. Neurologic: Patient has intact sensorium no deficits are noted   4. Musculoskeletal: Has good muscle strength and is ambulatory.    ASSESSMENT AND PLAN:   Diagnoses and all orders for this visit:    Onychomycosis        Plan: Patient will apply Lamisil cream between the toes and the bottoms of her feet 1 application 3 times weekly for a few more months in order to help prevent reoccurrence of see her  again in about 3 months or so and see how the nails continue to grow out.  The patient indicates understanding of these issues and agrees to the plan.  Today using a nail nippers were trimmed and debrided toenails 1-5 manually and mechanically in girth and width as far down to healthy tissue as possible on both feet.  This was done uneventfully there was no hemorrhage.  There is mild incurvation of the medial and lateral nail borders of the hallux which using a slant back technique were removed and they would not get ingrown.     Deven Muse, MAXIMEM

## 2024-11-20 ENCOUNTER — HOSPITAL ENCOUNTER (OUTPATIENT)
Dept: ULTRASOUND IMAGING | Facility: HOSPITAL | Age: 59
Discharge: HOME | End: 2024-11-20
Attending: OTOLARYNGOLOGY
Payer: COMMERCIAL

## 2024-11-20 DIAGNOSIS — E04.1 THYROID NODULE: ICD-10-CM

## 2024-11-20 PROCEDURE — 76536 US EXAM OF HEAD AND NECK: CPT | Performed by: OTOLARYNGOLOGY

## 2024-11-21 ENCOUNTER — TELEPHONE (OUTPATIENT)
Dept: ENDOCRINOLOGY CLINIC | Facility: CLINIC | Age: 59
End: 2024-11-21

## 2024-11-21 NOTE — TELEPHONE ENCOUNTER
Submitted Prolia IV via [employer]. Awaiting 3-5 business days. Patient has an appt for 12/23/2024.

## 2024-11-26 ENCOUNTER — PATIENT MESSAGE (OUTPATIENT)
Dept: ENDOCRINOLOGY CLINIC | Facility: CLINIC | Age: 59
End: 2024-11-26

## 2024-12-07 ENCOUNTER — LAB ENCOUNTER (OUTPATIENT)
Dept: LAB | Age: 59
End: 2024-12-07
Attending: INTERNAL MEDICINE
Payer: COMMERCIAL

## 2024-12-07 DIAGNOSIS — M81.0 AGE-RELATED OSTEOPOROSIS WITHOUT CURRENT PATHOLOGICAL FRACTURE: ICD-10-CM

## 2024-12-07 LAB
ALBUMIN SERPL-MCNC: 5 G/DL (ref 3.2–4.8)
ALBUMIN/GLOB SERPL: 1.7 {RATIO} (ref 1–2)
ALP LIVER SERPL-CCNC: 32 U/L
ALT SERPL-CCNC: 9 U/L
ANION GAP SERPL CALC-SCNC: 6 MMOL/L (ref 0–18)
AST SERPL-CCNC: 18 U/L (ref ?–34)
BILIRUB SERPL-MCNC: 0.6 MG/DL (ref 0.2–1.1)
BUN BLD-MCNC: 15 MG/DL (ref 9–23)
BUN/CREAT SERPL: 18.8 (ref 10–20)
CALCIUM BLD-MCNC: 10.3 MG/DL (ref 8.7–10.4)
CHLORIDE SERPL-SCNC: 107 MMOL/L (ref 98–112)
CO2 SERPL-SCNC: 30 MMOL/L (ref 21–32)
CREAT BLD-MCNC: 0.8 MG/DL
EGFRCR SERPLBLD CKD-EPI 2021: 84 ML/MIN/1.73M2 (ref 60–?)
FASTING STATUS PATIENT QL REPORTED: YES
GLOBULIN PLAS-MCNC: 2.9 G/DL (ref 2–3.5)
GLUCOSE BLD-MCNC: 102 MG/DL (ref 70–99)
OSMOLALITY SERPL CALC.SUM OF ELEC: 297 MOSM/KG (ref 275–295)
POTASSIUM SERPL-SCNC: 4.6 MMOL/L (ref 3.5–5.1)
PROT SERPL-MCNC: 7.9 G/DL (ref 5.7–8.2)
PTH-INTACT SERPL-MCNC: 49 PG/ML (ref 18.5–88)
SODIUM SERPL-SCNC: 143 MMOL/L (ref 136–145)
VIT D+METAB SERPL-MCNC: 68.1 NG/ML (ref 30–100)

## 2024-12-07 PROCEDURE — 36415 COLL VENOUS BLD VENIPUNCTURE: CPT

## 2024-12-07 PROCEDURE — 84080 ASSAY ALKALINE PHOSPHATASES: CPT

## 2024-12-07 PROCEDURE — 82306 VITAMIN D 25 HYDROXY: CPT

## 2024-12-07 PROCEDURE — 83970 ASSAY OF PARATHORMONE: CPT

## 2024-12-07 PROCEDURE — 80053 COMPREHEN METABOLIC PANEL: CPT

## 2024-12-11 LAB — ALKALINE PHOSPHATASE BONE SPECIFIC: 5.3 UG/L

## 2024-12-16 PROBLEM — D12.2 BENIGN NEOPLASM OF ASCENDING COLON: Status: ACTIVE | Noted: 2024-12-16

## 2024-12-16 PROBLEM — Z86.0100 PERSONAL HISTORY OF COLON POLYPS, UNSPECIFIED: Status: ACTIVE | Noted: 2024-12-16

## 2024-12-16 PROBLEM — D12.3 BENIGN NEOPLASM OF TRANSVERSE COLON: Status: ACTIVE | Noted: 2024-12-16

## 2024-12-23 ENCOUNTER — OFFICE VISIT (OUTPATIENT)
Dept: ENDOCRINOLOGY CLINIC | Facility: CLINIC | Age: 59
End: 2024-12-23
Payer: COMMERCIAL

## 2024-12-23 VITALS
HEART RATE: 81 BPM | DIASTOLIC BLOOD PRESSURE: 66 MMHG | HEIGHT: 60 IN | SYSTOLIC BLOOD PRESSURE: 101 MMHG | WEIGHT: 101 LBS | BODY MASS INDEX: 19.83 KG/M2

## 2024-12-23 DIAGNOSIS — M81.0 AGE-RELATED OSTEOPOROSIS WITHOUT CURRENT PATHOLOGICAL FRACTURE: Primary | ICD-10-CM

## 2024-12-23 DIAGNOSIS — E55.9 VITAMIN D DEFICIENCY: ICD-10-CM

## 2024-12-23 PROCEDURE — 3074F SYST BP LT 130 MM HG: CPT | Performed by: INTERNAL MEDICINE

## 2024-12-23 PROCEDURE — 3078F DIAST BP <80 MM HG: CPT | Performed by: INTERNAL MEDICINE

## 2024-12-23 PROCEDURE — 99213 OFFICE O/P EST LOW 20 MIN: CPT | Performed by: INTERNAL MEDICINE

## 2024-12-23 PROCEDURE — 96372 THER/PROPH/DIAG INJ SC/IM: CPT | Performed by: INTERNAL MEDICINE

## 2024-12-23 PROCEDURE — 3008F BODY MASS INDEX DOCD: CPT | Performed by: INTERNAL MEDICINE

## 2024-12-23 NOTE — PROGRESS NOTES
FU VISIT:     CHIEF COMPLAINT:    Osteoporosis     HISTORY OF PRESENT ILLNESS:   Debbie Whitley is a 59 year old female who presents for FU of Osteoporosis    Diagnosed on DXA in 2017  She started taking fosamax 70 mg weekly around that time  Has been compliant   She switched to prolia Dec 2023    No falls. No fractures: had a fall in 2014, fell from standing height , fractured a toe  Calcium / vitamin D supplementation: citracal 2 tabs BID; vit D 2000 units daily  x 5 days a week   Chronic pain medication/ antu seizure medication use: she has epilepsy( seizure free ) she takes keppra  H/o bariatric surgery malabsorption: denies    Smoking No  Excessive Alcohol intake No  History of thyroid disease Thyroid nodules  History of calcium problems: No   Menopause: Around age 46        H/o dental work: has crowns  H/o HA / strokes: denies  H/o renal stones denies  H/o heart burn: denies  H/o radiation: denies      Doing well  No falls. No fractures   No extensive dental work in the future --> had a crown in Sep 2024     PAST MEDICAL HISTORY:   Past Medical History:    Adenomatous polyp of sigmoid colon    Age-related osteoporosis without current pathological fracture    Blood in the stool    Body piercing    Constipation    Flatulence/gas pain/belching    Neurofibromatosis (HCC)    Osteopenia of spine    SEIZURE DISORDER    Seizures (HCC)    Thyroid disease    Wears glasses       PAST SURGICAL HISTORY:   Past Surgical History:   Procedure Laterality Date    Breast biopsy      Bilateral    Colonoscopy      Colonoscopy  11/2014    repeat in 5 yrs    D & c  2014    Eloina localization wire 1 site left (cpt=19281)  2014    benign    Eloina localization wire 1 site right (cpt=19281)  2014    benign    Skin surgery  2014    biopsies of the skin        CURRENT MEDICATIONS:    Current Outpatient Medications   Medication Sig Dispense Refill    levetiracetam 750 MG Oral Tab Take 1 tablet (750 mg total) by mouth 2 (two) times daily. 180  tablet 3    Cholecalciferol (VITAMIN D) 2000 units Oral Cap Take 1 capsule (2,000 Units total) by mouth daily. 30 capsule 0    Calcium Citrate-Vitamin D (CITRACAL + D OR) Take 2 tablets by mouth 2 (two) times daily.           ALLERGIES:  Allergies   Allergen Reactions    Dander      Pet      Seasonal Runny nose       SOCIAL HISTORY:    Social History     Socioeconomic History    Marital status: Single   Occupational History    Occupation: teacher   Tobacco Use    Smoking status: Never    Smokeless tobacco: Never    Tobacco comments:     Never smoked   Vaping Use    Vaping status: Never Used   Substance and Sexual Activity    Alcohol use: Yes     Comment: 1 beer or wine on holidays    Drug use: No   Other Topics Concern    Caffeine Concern Yes     Comment: 1  or 2 cups of tea or soda daily    Stress Concern No    Weight Concern No    Special Diet No    Exercise Yes     Comment: walking    Seat Belt Yes     Comment: always wear seatbelt       FAMILY HISTORY:   Family History   Problem Relation Age of Onset    Hypertension Father         Pace maker    Heart Attack Father     Hypertension Mother     Thyroid disease Mother         Hypothyroidism    Other (Other) Mother         polycythemia rubra vera    Thyroid disease Sister     Other (Alopecia) Sister     Breast Cancer Paternal Aunt         79       ASSESSMENTS:     REVIEW OF SYSTEMS:  Constitutional: Negative for: weight change, fever, fatigue, cold/heat intolerance  Eyes: Negative for:  Visual changes, proptosis, blurring  ENT: Negative for:  dysphagia, neck swelling, dysphonia  Respiratory: Negative for:  dyspnea, cough  Cardiovascular: Negative for:  chest pain, palpitations, orthopnea  GI: Negative for:  abdominal pain, nausea, vomiting, diarrhea, constipation, bleeding  Neurology: Negative for: headache, numbness, weakness  Genito-Urinary: Negative for: dysuria, frequency  Psychiatric: Negative for:  depression, anxiety  Hematology/Lymphatics: Negative for:  bruising, lower extremity edema  Endocrine: Negative for: polyuria, polydypsia  Skin: Negative for: rash, blister, cellulitis,      PHYSICAL EXAM:   Vitals:    12/23/24 1015   BP: 101/66   Pulse: 81   Weight: 101 lb (45.8 kg)   Height: 5' (1.524 m)       BMI: Body mass index is 19.73 kg/m².         General Appearance:  alert, well developed, in no acute distress  Head: Atraumatic  Eyes:  normal conjunctivae, sclera., normal sclera and normal pupils  Throat/Neck: normal sound to voice. Normal hearing, normal speech  Respiratory:  Speaking in full sentences, non-labored. no increased work of breathing, no audible wheezing    Neuro: motor grossly intact, moving all extremities without difficulty  Psychiatric:  oriented to time, self, and place        DATA:     Pertinent data reviewed      DXA 2023:   L spine - 2.7 Change - 2.9 %   Total hip - 2.3  Change - 4.3 %   FN - 2.4 Change 2.2 %     ASSESSMENT AND PLAN:      Patient is a 59 year old female with Osteoporosis      I  reviewed the following:   - Osteoporosis: pathogenesis and management  - Various agents for Osteoporosis management including anabolic and anti resorptive therapy discussed in a detail along with pros and cons , SE and CI    She has worsening BMD On DXA, switched to prolia in dec 2023    Doing well  Third injection today  Vit D to 2000 units for 5 --> 4 days a week       Prolia reviewed  Contraindications  Hypocalcemia   Pregnancy  Known hypersensitivity to Prolia    Warnings  Hypersensitivity including anaphylactic reactions may occur.   Hypocalcemia: Must be corrected before initiating Prolia. May worsen,   especially in patients with renal impairment. Adequately supplement   patients with calcium and vitamin D.  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  Contact the clinic if you develop new thigh or   groin pain to rule out an incomplete femoral fracture.  Multiple vertebral fractures  have been reported following Prolia   discontinuation. Patients should be transitioned to another antiresorptive   agent if Prolia is discontinued.   Serious infections including skin infections: May occur, including those   leading to hospitalization. Patients should seek prompt medical   attention if they develop signs or symptoms of infection, including   cellulitis.  Dermatologic reactions: Dermatitis, rashes, and eczema have been   reported  Severe bone, joint, muscle pain may occur  Suppression of bone turnover: Significant suppression has been   Demonstrated    Adverse reactions  Most common adverse reactions (> 5%   and more common than placebo) were: back pain, pain in extremity,  hypercholesterolemia, musculoskeletal pain, and cystitis        Diet: Eat foods with high calcium: milk with vitamin D added, fish from the ocean, yogurt, green leafy vegetables  Exercise:  lifelong physical activity at all ages is strongly endorsed by the National Osteopor[employer] Foundation. Exercise recommendations generally should include weight-bearing, muscle-strengthening, and balance training exercises for 30 minutes 5 days per week or 75 minutes twice weekly, often consistent with other general health recommendations. Weight-bearing exercises are activities that make the body move against gravity such as walking, jogging, dancing, tennis, and Javed Chi. To protect the spine in patients with low spinal bone density, maintaining a straight spine and avoiding arching and twisting are generally recommended. Fall precautions reviewed       She will FU with me 6 months     Orders Placed This Encounter   Procedures    Comp Metabolic Panel [E]    Alk Phosphatase, Bone Specific    PTH, Intact    Vitamin D [E]         Claudia Rodríguez MD        Patient verbalized a complete  understanding of all of the above and did not have any further questions.

## 2025-03-10 ENCOUNTER — OFFICE VISIT (OUTPATIENT)
Dept: PODIATRY CLINIC | Facility: CLINIC | Age: 60
End: 2025-03-10

## 2025-03-10 DIAGNOSIS — M79.674 PAIN OF TOE OF RIGHT FOOT: ICD-10-CM

## 2025-03-10 DIAGNOSIS — L60.0 ONYCHOCRYPTOSIS: ICD-10-CM

## 2025-03-10 DIAGNOSIS — M20.42 HAMMER TOE OF LEFT FOOT: Primary | ICD-10-CM

## 2025-03-10 DIAGNOSIS — L84 CALLUS OF FOOT: ICD-10-CM

## 2025-03-10 DIAGNOSIS — L84 HELOMA DURUM: ICD-10-CM

## 2025-03-10 DIAGNOSIS — M77.41 METATARSALGIA OF RIGHT FOOT: ICD-10-CM

## 2025-03-10 PROCEDURE — 99213 OFFICE O/P EST LOW 20 MIN: CPT | Performed by: PODIATRIST

## 2025-03-11 NOTE — PROGRESS NOTES
Debbie Whitley is a 59 year old female.   Chief Complaint   Patient presents with    Follow - Up     Nail fungus         HPI:   Returns to the clinic for follow-up on her fungal nail treatment as well as complaining about corn.  At today's visit reviewed nurse's history as taken above, allergies medications and medical history as documented below.  All changes duly noted  Allergies: Dander and Seasonal   Current Outpatient Medications   Medication Sig Dispense Refill    levetiracetam 750 MG Oral Tab Take 1 tablet (750 mg total) by mouth 2 (two) times daily. 180 tablet 3    Cholecalciferol (VITAMIN D) 2000 units Oral Cap Take 1 capsule (2,000 Units total) by mouth daily. 30 capsule 0    Calcium Citrate-Vitamin D (CITRACAL + D OR) Take 2 tablets by mouth 2 (two) times daily.          Past Medical History:    Adenomatous polyp of sigmoid colon    Age-related osteoporosis without current pathological fracture    Blood in the stool    Body piercing    Constipation    Flatulence/gas pain/belching    Neurofibromatosis (HCC)    Osteopenia of spine    SEIZURE DISORDER    Seizures (HCC)    Thyroid disease    Wears glasses      Past Surgical History:   Procedure Laterality Date    Breast biopsy      Bilateral    Colonoscopy      Colonoscopy  11/2014    repeat in 5 yrs    D & c  2014    Eloina localization wire 1 site left (cpt=19281)  2014    benign    Eloina localization wire 1 site right (cpt=19281)  2014    benign    Skin surgery  2014    biopsies of the skin       Family History   Problem Relation Age of Onset    Hypertension Father         Pace maker    Heart Attack Father     Hypertension Mother     Thyroid disease Mother         Hypothyroidism    Other (Other) Mother         polycythemia rubra vera    Thyroid disease Sister     Other (Alopecia) Sister     Breast Cancer Paternal Aunt         79      Social History     Socioeconomic History    Marital status: Single   Occupational History    Occupation: teacher   Tobacco Use     Smoking status: Never    Smokeless tobacco: Never    Tobacco comments:     Never smoked   Vaping Use    Vaping status: Never Used   Substance and Sexual Activity    Alcohol use: Yes     Comment: 1 beer or wine on holidays    Drug use: No   Other Topics Concern    Caffeine Concern Yes     Comment: 1  or 2 cups of tea or soda daily    Stress Concern No    Weight Concern No    Special Diet No    Exercise Yes     Comment: walking    Seat Belt Yes     Comment: always wear seatbelt           REVIEW OF SYSTEMS:   Today reviewed systens as documented below  GENERAL HEALTH: feels well otherwise  SKIN: Refer to exam below  RESPIRATORY: denies shortness of breath with exertion  CARDIOVASCULAR: denies chest pain on exertion  GI: denies abdominal pain and denies heartburn  NEURO: denies headaches    EXAM:   LMP  (LMP Unknown)   GENERAL: well developed, well nourished, in no apparent distress  EXTREMITIES:   1. Integument: Skin was evaluated there is lines of demarcation and all of the affected toenails.  She is responding well to the Lamisil tablets no desquamation noted.  All affected toenails have a very nice line of demarcation.   2. Vascular: Patient has palpable pulses   3. Neurologic: Patient has intact sensorium no deficits are noted   4. Musculoskeletal: Has good muscle strength and is ambulatory.    ASSESSMENT AND PLAN:   Diagnoses and all orders for this visit:    Hammer toe of left foot    Heloma durum    Callus of foot    Metatarsalgia of right foot    Onychocryptosis    Pain of toe of right foot        Plan: Patient will apply Lamisil cream between the toes and the bottoms of her feet 1 application 3 times weekly for a few more months in order to help prevent reoccurrence of see her again in about 3 months or so and see how the nails continue to grow out.  The patient indicates understanding of these issues and agrees to the plan.  Using a sterile 15 blade trim down any hyperkeratoses  Today using a nail nippers  were trimmed and debrided toenails 1-5 manually and mechanically in girth and width as far down to healthy tissue as possible on both feet.  This was done uneventfully there was no hemorrhage.  There is mild incurvation of the medial and lateral nail borders of the hallux which using a slant back technique were removed and they would not get ingrown.     Deven Muse, JOESPH

## 2025-06-06 ENCOUNTER — TELEPHONE (OUTPATIENT)
Dept: ENDOCRINOLOGY CLINIC | Facility: CLINIC | Age: 60
End: 2025-06-06

## 2025-06-06 NOTE — TELEPHONE ENCOUNTER
----- Message from Hugo ESPINOZA sent at 12/23/2024 10:36 AM CST -----  Regarding: prolia IV  Please start Prolia IV last inj was 12/23/24 with AM

## 2025-06-09 ENCOUNTER — PATIENT MESSAGE (OUTPATIENT)
Dept: ENDOCRINOLOGY CLINIC | Facility: CLINIC | Age: 60
End: 2025-06-09

## 2025-06-09 ENCOUNTER — OFFICE VISIT (OUTPATIENT)
Dept: NEUROLOGY | Facility: CLINIC | Age: 60
End: 2025-06-09
Payer: COMMERCIAL

## 2025-06-09 ENCOUNTER — LAB ENCOUNTER (OUTPATIENT)
Dept: LAB | Age: 60
End: 2025-06-09
Attending: INTERNAL MEDICINE
Payer: COMMERCIAL

## 2025-06-09 VITALS
RESPIRATION RATE: 14 BRPM | OXYGEN SATURATION: 98 % | TEMPERATURE: 98 F | SYSTOLIC BLOOD PRESSURE: 115 MMHG | BODY MASS INDEX: 20 KG/M2 | DIASTOLIC BLOOD PRESSURE: 64 MMHG | HEIGHT: 60 IN | HEART RATE: 79 BPM

## 2025-06-09 DIAGNOSIS — M81.0 AGE-RELATED OSTEOPOROSIS WITHOUT CURRENT PATHOLOGICAL FRACTURE: ICD-10-CM

## 2025-06-09 DIAGNOSIS — E55.9 VITAMIN D DEFICIENCY: ICD-10-CM

## 2025-06-09 DIAGNOSIS — G40.209 PARTIAL SYMPTOMATIC EPILEPSY WITH COMPLEX PARTIAL SEIZURES, NOT INTRACTABLE, WITHOUT STATUS EPILEPTICUS (HCC): ICD-10-CM

## 2025-06-09 LAB
PTH-INTACT SERPL-MCNC: 29.8 PG/ML (ref 18.5–88)
VIT D+METAB SERPL-MCNC: 58.7 NG/ML (ref 30–100)

## 2025-06-09 PROCEDURE — 99214 OFFICE O/P EST MOD 30 MIN: CPT | Performed by: OTHER

## 2025-06-09 PROCEDURE — 84080 ASSAY ALKALINE PHOSPHATASES: CPT

## 2025-06-09 PROCEDURE — 36415 COLL VENOUS BLD VENIPUNCTURE: CPT

## 2025-06-09 PROCEDURE — 3078F DIAST BP <80 MM HG: CPT | Performed by: OTHER

## 2025-06-09 PROCEDURE — 83970 ASSAY OF PARATHORMONE: CPT

## 2025-06-09 PROCEDURE — 3074F SYST BP LT 130 MM HG: CPT | Performed by: OTHER

## 2025-06-09 PROCEDURE — 82306 VITAMIN D 25 HYDROXY: CPT

## 2025-06-09 RX ORDER — LEVETIRACETAM 750 MG/1
750 TABLET ORAL 2 TIMES DAILY
Qty: 180 TABLET | Refills: 3 | Status: SHIPPED | OUTPATIENT
Start: 2025-06-09

## 2025-06-09 NOTE — PATIENT INSTRUCTIONS
Refill policies:    Allow 2-3 business days for refills; controlled substances may take longer.  Contact your pharmacy at least 5 days prior to running out of medication and have them send an electronic request or submit request through the “request refill” option in your [employer] account.  Refills are not addressed on weekends; covering physicians do not authorize routine medications on weekends.  No narcotics or controlled substances are refilled after noon on Fridays or by on call physicians.  By law, narcotics must be electronically prescribed.  A 30 day supply with no refills is the maximum allowed.  If your prescription is due for a refill, you may be due for a follow up appointment.  To best provide you care, patients receiving routine medications need to be seen at least once a year.  Patients receiving narcotic/controlled substance medications need to be seen at least once every 3 months.  In the event that your preferred pharmacy does not have the requested medication in stock (e.g. Backordered), it is your responsibility to find another pharmacy that has the requested medication available.  We will gladly send a new prescription to that pharmacy at your request.    Scheduling Tests:    If your physician has ordered radiology tests such as MRI or CT scans, please contact Central Scheduling at [phone] right away to schedule the test.  Once scheduled, the [facility] Centralized Referral Team will work with your insurance carrier to obtain pre-certification or prior authorization.  Depending on your insurance carrier, approval may take 3-10 days.  It is highly recommended patients assure they have received an authorization before having a test performed.  If test is done without insurance authorization, patient may be responsible for the entire amount billed.      Precertification and Prior Authorizations:  If your physician has recommended that you have a procedure or additional testing performed the [facility]  Centralized Referral Team will contact your insurance carrier to obtain pre-certification or prior authorization.    You are strongly encouraged to contact your insurance carrier to verify that your procedure/test has been approved and is a COVERED benefit.  Although the [facility] Centralized Referral Team does its due diligence, the insurance carrier gives the disclaimer that \"Although the procedure is authorized, this does not guarantee payment.\"    Ultimately the patient is responsible for payment.   Thank you for your understanding in this matter.  Paperwork Completion:  If you require FMLA or disability paperwork for your recovery, please make sure to either drop it off or have it faxed to our office at [phone]. Be sure the form has your name and date of birth on it.  The form will be faxed to our Forms Department and they will complete it for you.  There is a 25$ fee for all forms that need to be filled out.  Please be aware there is a 10-14 day turnaround time.  You will need to sign a release of information (ZAHRA) form if your paperwork does not come with one.  You may call the Forms Department with any questions at [phone].  Their fax number is [phone].

## 2025-06-09 NOTE — PROGRESS NOTES
NEUROLOGY  [facility]       Debbie Whitley  8/10/1965  Primary Care Provider:  Brenda Crisostomo MD    6/9/2025  59 year old yo,     Assessment & plans last visit:  Neurofibromatosis and seizures (remote)    Previous visit and existing record notes reviewed in preparation for the face to face visit.  Relevant labs and studies reviewed and will be noted in relevant areas of this record.  Accompanied visit:     (x) No.      Present condition:  Since her last visit she has not had any seizure or any minor or generalized breakthrough.  No side effects from the medication.    She relates that once in a while whenever she brushes her skin against something she may feel a very brief pain in the skin not long-lasting.    She has not noticed any neurofibroma that has been enlarging.  She has had a right thyroid nodule that is the same and is being followed by ENT.    She has no more than the usual minor headaches.    Denies any spine pain in the neck or low back or any bladder dysfunction denies any balance problems.    Past History Reviewed & update/new problem(s): No new major medical diagnoses since last visit    Review of Systems:  Review of Systems:  Denies systemic symptoms.     No CP or SOB.  No GI or  symptoms. Relevant Neuro as noted above.      Medications:    Medications - Current[1]  PRN: PRN Medications[2]    Allergies:  Allergies[3]       EXAM:  /64   Pulse 79   Temp 98 °F (36.7 °C) (Temporal)   Resp 14   Ht 60\"   LMP  (LMP Unknown)   SpO2 98%   BMI 19.73 kg/m²   Looks stated age  General Exam:  HENT:  pink conjunctiva anicteric sclerae  Neck no adenopathy, thyroid normal  Heart and Lungs:  normal  Extremities: no cyanosis, skin changes consistent with multiple neurofibromas    NEURO  NEURO  Able to relate events with fluent speech and intact comprehension  CN 2-12: pupils reactive, VF full face symmetric sensation and movement tongue midline  No motor focal findings  Sensory:  no lateralizing findings  Reflexes are symmetric  UMN signs: none  Gait: narrow based          INTERPRETATION of RELEVANT LABS and other DATA:          Problem/s Identified this visit:   1. Partial symptomatic epilepsy with complex partial seizures, not intractable, without status epilepticus (HCC)          Discussion plus Diagnostics & Treatment Orders:  Continue Keppra  Keep on yearly follow up      (x) Discussed potential side effects of any treatment relevant to above.  Includes explanation of tests as necessary.    Return in about 1 year (around 6/9/2026).      Patient understands that if needed, based on condition and or test results, follow up will be readjusted      Que Ely MD  Vascular & General Neurology  Director, Multiple Sclerosis Program  [facility]  6/9/2025, Time completed 9:32 AM    Decision making:  ( x ) labs reviewed/ordered - 1  (  ) new diagnosis: - 1  ( x) Images & studies independently reviewed -non F2F  (  ) Case/studies discussed with other caregivers - -non F2F  (  ) Telephone time with patiern or authorized Fam member--non F2F  ( x ) other records reviewed --non F2F including consultations  (  ) [facility] meetings - patient not present --non F2F  (  ) Independent Historian obtained    Non Face to Face CPT code 72487/62877 applies as documented above    PROCEDURE DONE     (   ) see notes        After visit, patient was escorted out and handed-off discharge process and instructions to the check out desk.  No additional issues relevant to visit were raised to staff at this time interval.        This document is to be interpreted as my current opinion regarding the case as of the stated date of service based on the information available to me at this time and may supersedes any prior opinion expressed either orally or in writing.  Services rendered are only within the scope of direct medical care  Sometimes, reports may have been prepared partially using a speech  recognition software technology.  If a word or phrase is confusing or out of context, please do not hesitate to call for clarification.              [1]   Current Outpatient Medications:     levetiracetam 750 MG Oral Tab, Take 1 tablet (750 mg total) by mouth 2 (two) times daily., Disp: 180 tablet, Rfl: 3    Cholecalciferol (VITAMIN D) 2000 units Oral Cap, Take 1 capsule (2,000 Units total) by mouth daily. (Patient taking differently: Take 1 capsule (2,000 Units total) by mouth in the morning. Pt currently takes only 4 days a week.), Disp: 30 capsule, Rfl: 0    Calcium Citrate-Vitamin D (CITRACAL + D OR), Take 2 tablets by mouth 2 (two) times daily.  , Disp: , Rfl:   [2] [3]   Allergies  Allergen Reactions    Dander      Pet      Seasonal Runny nose

## 2025-06-11 NOTE — TELEPHONE ENCOUNTER
Received pt's Prolia SOB via [employer] **    PA Required: NO  Prolia OOP COST: 40%  Facility Fee: NA  Admin Fee: 40%

## 2025-06-12 LAB — ALKALINE PHOSPHATASE BONE SPECIFIC: 4.8 UG/L

## 2025-06-16 ENCOUNTER — OFFICE VISIT (OUTPATIENT)
Dept: INTERNAL MEDICINE CLINIC | Facility: CLINIC | Age: 60
End: 2025-06-16
Payer: COMMERCIAL

## 2025-06-16 VITALS
BODY MASS INDEX: 19.66 KG/M2 | SYSTOLIC BLOOD PRESSURE: 108 MMHG | HEART RATE: 76 BPM | DIASTOLIC BLOOD PRESSURE: 74 MMHG | RESPIRATION RATE: 12 BRPM | HEIGHT: 60.5 IN | WEIGHT: 102.81 LBS | TEMPERATURE: 97 F

## 2025-06-16 DIAGNOSIS — Z13.29 SCREENING FOR ENDOCRINE, METABOLIC AND IMMUNITY DISORDER: ICD-10-CM

## 2025-06-16 DIAGNOSIS — Z00.00 PHYSICAL EXAM, ANNUAL: Primary | ICD-10-CM

## 2025-06-16 DIAGNOSIS — Z13.228 SCREENING FOR ENDOCRINE, METABOLIC AND IMMUNITY DISORDER: ICD-10-CM

## 2025-06-16 DIAGNOSIS — Z78.0 POSTMENOPAUSAL: ICD-10-CM

## 2025-06-16 DIAGNOSIS — Z12.31 ENCOUNTER FOR SCREENING MAMMOGRAM FOR MALIGNANT NEOPLASM OF BREAST: ICD-10-CM

## 2025-06-16 DIAGNOSIS — Z13.0 SCREENING FOR ENDOCRINE, METABOLIC AND IMMUNITY DISORDER: ICD-10-CM

## 2025-06-16 NOTE — PROGRESS NOTES
[facility]    CHIEF COMPLAINT:   Chief Complaint   Patient presents with    Routine Physical     Reviewed Preventative/Wellness form with patient. 6/30/22-normal pap, neg HPV. 7/15/24-mammo. 12/16/24-colon-repeat 5 years    Immunization/Injection     Declines Prevnar 20 today            The following individual(s) verbally consented to be recorded using ambient AI listening technology and understand that they can each withdraw their consent to this listening technology at any point by asking the clinician to turn off or pause the recording:    Patient name: Debbie Whitley  Additional names:  none          HPI:   Debbie Whitley is a 59 year old female who presents for a complete physical exam. Symptoms: denies discharge, itching, burning or dysuria.     Pap done 6/2022 negative with negative hpv.   Mammo done 7/2024, negative.   Colonoscopy done 12/2024, repeat in 5 years. Showed an adenoma.   Dexa done 7/2023 showed osteoporosis. On prolia per endocrine. Dexa ordered per pt request.      Up to date tdap, covid booster, shingrix.   Due prevnar 20. Declines.       Exercise: walking     Derm: no concerning skin lesions or moles.     history of epilepsy. On meds per neuro.   Has neurofibromatosis. Has a lot of neuromas of the skin. Her hearing is fine.      history of thyroid nodules. Sees ent. She has an appt in a month.     History of Present Illness  She has neurofibromatosis and experiences occasional pain when something brushes against the bumps. No new symptoms related to her neurofibromatosis are reported, and her hearing remains fine.    She has thyroid nodules and is seeing an ENT specialist. She had an ultrasound and is scheduled for a follow-up next month.     She mentions a long-standing mass in right flank under the axilla, not breast mass, that has been present for about ten years, which has not changed in size or character. She had surgery a decade ago, and biopsies were performed at that  time and were negative.     Her immunizations are up to date, including tetanus, shingles, and COVID-19 vaccines.         Wt Readings from Last 6 Encounters:   06/16/25 102 lb 12.8 oz (46.6 kg)   12/23/24 101 lb (45.8 kg)   11/19/24 105 lb (47.6 kg)   07/16/24 105 lb (47.6 kg)   06/21/24 105 lb 3.2 oz (47.7 kg)   06/14/24 105 lb 4.8 oz (47.8 kg)     Body mass index is 19.75 kg/m².       Current Medications[1]   Past Medical History[2]   Past Surgical History[3]   Family History[4]   Social History:   Short Social Hx on File[5]  : single   Exercise: see hpi.  Diet: watches calories closely     REVIEW OF SYSTEMS:   GENERAL: feels well otherwise  SKIN: neurofibromas present  EYES:denies blurred vision or double vision  HEENT: denies nasal congestion, sinus pain or ST  LUNGS: denies shortness of breath with exertion  CARDIOVASCULAR: denies chest pain on exertion  GI: denies abdominal pain,denies heartburn  : denies dysuria, vaginal discharge or itching,  MUSCULOSKELETAL: denies back pain  NEURO: denies headaches  PSYCHE: denies depression or anxiety  HEMATOLOGIC: denies hx of anemia  ENDOCRINE: see hpi  ALL/ASTHMA: denies hx of allergy or asthma    EXAM:   /74 (BP Location: Left arm, Patient Position: Sitting, Cuff Size: adult)   Pulse 76   Temp 97.3 °F (36.3 °C) (Temporal)   Resp 12   Ht 5' 0.5\" (1.537 m)   Wt 102 lb 12.8 oz (46.6 kg)   LMP  (LMP Unknown)   Breastfeeding No   BMI 19.75 kg/m²   Body mass index is 19.75 kg/m².   GENERAL: well developed, well nourished,in no apparent distress  SKIN: neurofibromas  HEENT: atraumatic, normocephalic,ears and throat are clear  EYES:PERRLA, conjunctiva are clear  NECK: supple,no adenopathy,no bruits  CHEST: no chest tenderness  LUNGS: clear to auscultation  CARDIO: nl s1 and s2, RRR without murmur  GI: good BS's,no masses, HSM or tenderness  BREAST: no dominant or suspicious mass, no nipple discharge, has a mass on the lateral side of the right breast,  non tender, well demarkated, mobile. Has been there for over 10 years per pt.   GENITAL/URINARY:  External Genitalia:  General appearance; normal, Hair distribution; normal, Lesions absent, Urethral Meatus:  Size normal, Location normal, Lesions absent, Prolapse absent, Vagina:  General appearance normal, Lesions absent, Pelvic support normal, Cervix:  General appearance normal, Discharge absent, Tenderness absent, Enlargement absent, Uterus:  Masses absent, Adnexa:  Masses absent, Tenderness absent, Anus/Perineum:  Lesions absent and Masses absent  No pap today.   MUSCULOSKELETAL: back is not tender,FROM of the back  EXTREMITIES: no cyanosis, clubbing or edema  NEURO: Oriented times three,cranial nerves are intact,motor and sensory are grossly intact    Labs:   Lab Results   Component Value Date/Time    WBC 5.9 06/17/2024 07:22 AM    HGB 14.3 06/17/2024 07:22 AM    .0 06/17/2024 07:22 AM      Lab Results   Component Value Date/Time     (H) 12/07/2024 08:14 AM     12/07/2024 08:14 AM    K 4.6 12/07/2024 08:14 AM     12/07/2024 08:14 AM    CO2 30.0 12/07/2024 08:14 AM    CREATSERUM 0.80 12/07/2024 08:14 AM    CA 10.3 12/07/2024 08:14 AM    ALB 5.0 (H) 12/07/2024 08:14 AM    TP 7.9 12/07/2024 08:14 AM    ALKPHO 32 (L) 12/07/2024 08:14 AM    AST 18 12/07/2024 08:14 AM    ALT 9 (L) 12/07/2024 08:14 AM    BILT 0.6 12/07/2024 08:14 AM    TSH 2.850 06/17/2024 07:22 AM    T4F 0.8 06/14/2023 07:28 AM        Lab Results   Component Value Date/Time    CHOLEST 202 (H) 06/17/2024 07:22 AM    HDL 66 (H) 06/17/2024 07:22 AM    TRIG 116 06/17/2024 07:22 AM     (H) 06/17/2024 07:22 AM    NONHDLC 136 (H) 06/17/2024 07:22 AM       Lab Results   Component Value Date/Time    A1C 5.7 (H) 06/17/2024 07:22 AM      Vitamin D:    Lab Results   Component Value Date    VITD 58.7 06/09/2025           ASSESSMENT AND PLAN:   Debbie Whitley is a 59 year old female who presents for a complete physical exam.   1.  Physical exam, annual  Do labs.   Pelvic and breast exam done.   Mammo and dexa ordered.   Other HM discussed.   Immunizations discussed.   Urged regular exercise.   Monitor the right chest wall mass and rtc if changing. Has been there or many years and has not changed.   follow up with neuro and ent.   - CBC With Differential With Platelet; Future  - Comp Metabolic Panel; Future  - Lipid Panel; Future  - Hemoglobin A1C; Future  - TSH W Reflex To Free T4; Future    2. Encounter for screening mammogram for malignant neoplasm of breast  - ELOINA GILBERT 2D+3D SCREENING BILAT (CPT=77067/20357); Future    3. Postmenopausal  - XR DEXA BONE DENSITOMETRY (CPT=77080); Future    4. Screening for endocrine, metabolic and immunity disorder  - Hemoglobin A1C; Future        Return in about 1 year (around 6/16/2026) for physical.      Brenda Crisostomo MD         [1]   Current Outpatient Medications   Medication Sig Dispense Refill    levetiracetam 750 MG Oral Tab Take 1 tablet (750 mg total) by mouth 2 (two) times daily. 180 tablet 3    Cholecalciferol (VITAMIN D) 2000 units Oral Cap Take 1 capsule (2,000 Units total) by mouth daily. (Patient taking differently: Take 1 capsule (2,000 Units total) by mouth in the morning. Pt currently takes only 4 days a week.) 30 capsule 0    Calcium Citrate-Vitamin D (CITRACAL + D OR) Take 2 tablets by mouth 2 (two) times daily.       [2]   Past Medical History:   Adenomatous polyp of sigmoid colon    Age-related osteoporosis without current pathological fracture    Blood in the stool    Body piercing    Constipation    Flatulence/gas pain/belching    Neurofibromatosis (HCC)    Osteopenia of spine    SEIZURE DISORDER    Seizures (HCC)    Thyroid disease    Wears glasses   [3]   Past Surgical History:  Procedure Laterality Date    Breast biopsy      Bilateral    Colonoscopy      Colonoscopy  11/2014    repeat in 5 yrs    D & c  2014    Eloina localization wire 1 site left (cpt=19281)  2014    benign    Eloina  localization wire 1 site right (cpt=19281)  2014    benign    Skin surgery  2014    biopsies of the skin    [4]   Family History  Problem Relation Age of Onset    Hypertension Father         Pace maker    Heart Attack Father     Hypertension Mother     Thyroid disease Mother         Hypothyroidism    Other (Other) Mother         polycythemia rubra vera    Thyroid disease Sister     Other (Alopecia) Sister     Breast Cancer Paternal Aunt         79   [5]   Social History  Socioeconomic History    Marital status: Single   Occupational History    Occupation: teacher   Tobacco Use    Smoking status: Never    Smokeless tobacco: Never    Tobacco comments:     Never smoked   Vaping Use    Vaping status: Never Used   Substance and Sexual Activity    Alcohol use: Yes     Comment: 1 beer or wine on holidays    Drug use: No   Other Topics Concern    Caffeine Concern Yes     Comment: 1  or 2 cups of tea or soda daily    Stress Concern No    Weight Concern No    Special Diet No    Exercise Yes     Comment: walking    Seat Belt Yes     Comment: always wear seatbelt     Social Drivers of Health     Food Insecurity: No Food Insecurity (6/16/2025)    NCSS - Food Insecurity     Worried About Running Out of Food in the Last Year: No     Ran Out of Food in the Last Year: No   Transportation Needs: No Transportation Needs (6/16/2025)    NCSS - Transportation     Lack of Transportation: No   Housing Stability: Not At Risk (6/16/2025)    NCSS - Housing/Utilities     Has Housing: Yes     Worried About Losing Housing: No     Unable to Get Utilities: No

## 2025-06-17 ENCOUNTER — PATIENT MESSAGE (OUTPATIENT)
Dept: ENDOCRINOLOGY CLINIC | Facility: CLINIC | Age: 60
End: 2025-06-17

## 2025-06-18 ENCOUNTER — LAB ENCOUNTER (OUTPATIENT)
Dept: LAB | Age: 60
End: 2025-06-18
Attending: INTERNAL MEDICINE
Payer: COMMERCIAL

## 2025-06-18 ENCOUNTER — PATIENT MESSAGE (OUTPATIENT)
Dept: INTERNAL MEDICINE CLINIC | Facility: CLINIC | Age: 60
End: 2025-06-18

## 2025-06-18 DIAGNOSIS — Z13.29 SCREENING FOR ENDOCRINE, METABOLIC AND IMMUNITY DISORDER: ICD-10-CM

## 2025-06-18 DIAGNOSIS — Z13.228 SCREENING FOR ENDOCRINE, METABOLIC AND IMMUNITY DISORDER: ICD-10-CM

## 2025-06-18 DIAGNOSIS — Z13.0 SCREENING FOR ENDOCRINE, METABOLIC AND IMMUNITY DISORDER: ICD-10-CM

## 2025-06-18 DIAGNOSIS — Z00.00 PHYSICAL EXAM, ANNUAL: ICD-10-CM

## 2025-06-18 LAB
ALBUMIN SERPL-MCNC: 4.7 G/DL (ref 3.2–4.8)
ALBUMIN/GLOB SERPL: 1.8 {RATIO} (ref 1–2)
ALP LIVER SERPL-CCNC: 25 U/L (ref 46–118)
ALT SERPL-CCNC: 11 U/L (ref 10–49)
ANION GAP SERPL CALC-SCNC: 10 MMOL/L (ref 0–18)
AST SERPL-CCNC: 19 U/L (ref ?–34)
BASOPHILS # BLD AUTO: 0.1 X10(3) UL (ref 0–0.2)
BASOPHILS NFR BLD AUTO: 1.5 %
BILIRUB SERPL-MCNC: 0.8 MG/DL (ref 0.2–1.1)
BUN BLD-MCNC: 15 MG/DL (ref 9–23)
CALCIUM BLD-MCNC: 9.7 MG/DL (ref 8.7–10.6)
CHLORIDE SERPL-SCNC: 103 MMOL/L (ref 98–112)
CHOLEST SERPL-MCNC: 202 MG/DL (ref ?–200)
CO2 SERPL-SCNC: 28 MMOL/L (ref 21–32)
CREAT BLD-MCNC: 0.85 MG/DL (ref 0.55–1.02)
EGFRCR SERPLBLD CKD-EPI 2021: 78 ML/MIN/1.73M2 (ref 60–?)
EOSINOPHIL # BLD AUTO: 0.21 X10(3) UL (ref 0–0.7)
EOSINOPHIL NFR BLD AUTO: 3.1 %
ERYTHROCYTE [DISTWIDTH] IN BLOOD BY AUTOMATED COUNT: 12.9 %
EST. AVERAGE GLUCOSE BLD GHB EST-MCNC: 123 MG/DL (ref 68–126)
FASTING PATIENT LIPID ANSWER: YES
FASTING STATUS PATIENT QL REPORTED: YES
GLOBULIN PLAS-MCNC: 2.6 G/DL (ref 2–3.5)
GLUCOSE BLD-MCNC: 97 MG/DL (ref 70–99)
HBA1C MFR BLD: 5.9 % (ref ?–5.7)
HCT VFR BLD AUTO: 42.3 % (ref 35–48)
HDLC SERPL-MCNC: 76 MG/DL (ref 40–59)
HGB BLD-MCNC: 13.9 G/DL (ref 12–16)
IMM GRANULOCYTES # BLD AUTO: 0.02 X10(3) UL (ref 0–1)
IMM GRANULOCYTES NFR BLD: 0.3 %
LDLC SERPL CALC-MCNC: 107 MG/DL (ref ?–100)
LYMPHOCYTES # BLD AUTO: 1.97 X10(3) UL (ref 1–4)
LYMPHOCYTES NFR BLD AUTO: 29.5 %
MCH RBC QN AUTO: 28.9 PG (ref 26–34)
MCHC RBC AUTO-ENTMCNC: 32.9 G/DL (ref 31–37)
MCV RBC AUTO: 87.9 FL (ref 80–100)
MONOCYTES # BLD AUTO: 0.66 X10(3) UL (ref 0.1–1)
MONOCYTES NFR BLD AUTO: 9.9 %
NEUTROPHILS # BLD AUTO: 3.71 X10 (3) UL (ref 1.5–7.7)
NEUTROPHILS # BLD AUTO: 3.71 X10(3) UL (ref 1.5–7.7)
NEUTROPHILS NFR BLD AUTO: 55.7 %
NONHDLC SERPL-MCNC: 126 MG/DL (ref ?–130)
OSMOLALITY SERPL CALC.SUM OF ELEC: 293 MOSM/KG (ref 275–295)
PLATELET # BLD AUTO: 304 10(3)UL (ref 150–450)
POTASSIUM SERPL-SCNC: 4.6 MMOL/L (ref 3.5–5.1)
PROT SERPL-MCNC: 7.3 G/DL (ref 5.7–8.2)
RBC # BLD AUTO: 4.81 X10(6)UL (ref 3.8–5.3)
SODIUM SERPL-SCNC: 141 MMOL/L (ref 136–145)
TRIGL SERPL-MCNC: 108 MG/DL (ref 30–149)
TSI SER-ACNC: 2.2 UIU/ML (ref 0.55–4.78)
VLDLC SERPL CALC-MCNC: 18 MG/DL (ref 0–30)
WBC # BLD AUTO: 6.7 X10(3) UL (ref 4–11)

## 2025-06-18 PROCEDURE — 80053 COMPREHEN METABOLIC PANEL: CPT

## 2025-06-18 PROCEDURE — 80061 LIPID PANEL: CPT

## 2025-06-18 PROCEDURE — 85025 COMPLETE CBC W/AUTO DIFF WBC: CPT

## 2025-06-18 PROCEDURE — 36415 COLL VENOUS BLD VENIPUNCTURE: CPT

## 2025-06-18 PROCEDURE — 83036 HEMOGLOBIN GLYCOSYLATED A1C: CPT

## 2025-06-18 PROCEDURE — 84443 ASSAY THYROID STIM HORMONE: CPT

## 2025-06-30 ENCOUNTER — OFFICE VISIT (OUTPATIENT)
Dept: ENDOCRINOLOGY CLINIC | Facility: CLINIC | Age: 60
End: 2025-06-30
Payer: COMMERCIAL

## 2025-06-30 VITALS
HEIGHT: 60 IN | HEART RATE: 106 BPM | BODY MASS INDEX: 20.22 KG/M2 | WEIGHT: 103 LBS | SYSTOLIC BLOOD PRESSURE: 110 MMHG | DIASTOLIC BLOOD PRESSURE: 69 MMHG

## 2025-06-30 DIAGNOSIS — M81.0 AGE-RELATED OSTEOPOROSIS WITHOUT CURRENT PATHOLOGICAL FRACTURE: Primary | ICD-10-CM

## 2025-06-30 DIAGNOSIS — E55.9 VITAMIN D DEFICIENCY: ICD-10-CM

## 2025-06-30 NOTE — PROGRESS NOTES
FU VISIT:     CHIEF COMPLAINT:    Osteoporosis     HISTORY OF PRESENT ILLNESS:   Debbie Whitley is a 59 year old female who presents for FU of Osteoporosis    Diagnosed on DXA in 2017  She started taking fosamax 70 mg weekly around that time  Has been compliant   She switched to prolia Dec 2023    No falls. No fractures: had a fall in 2014, fell from standing height , fractured a toe  Calcium / vitamin D supplementation: citracal 2 tabs BID; vit D 2000 units daily  x 4 days a week   Chronic pain medication/ antu seizure medication use: she has epilepsy( seizure free ) she takes keppra  H/o bariatric surgery malabsorption: denies    Smoking No  Excessive Alcohol intake No  History of thyroid disease Thyroid nodules  History of calcium problems: No   Menopause: Around age 46        H/o dental work: has crowns  H/o HA / strokes: denies  H/o renal stones denies  H/o heart burn: denies  H/o radiation: denies      Doing well  No falls. No fractures   No extensive dental work recently / in the future --> had a crown in Sep 2024     PAST MEDICAL HISTORY:   Past Medical History:    Adenomatous polyp of sigmoid colon    Age-related osteoporosis without current pathological fracture    Blood in the stool    Body piercing    Constipation    Flatulence/gas pain/belching    Neurofibromatosis (HCC)    Osteopenia of spine    SEIZURE DISORDER    Seizures (HCC)    Thyroid disease    Wears glasses       PAST SURGICAL HISTORY:   Past Surgical History:   Procedure Laterality Date    Breast biopsy      Bilateral    Colonoscopy      Colonoscopy  11/2014    repeat in 5 yrs    D & c  2014    Eloina localization wire 1 site left (cpt=19281)  2014    benign    Eloina localization wire 1 site right (cpt=19281)  2014    benign    Skin surgery  2014    biopsies of the skin        CURRENT MEDICATIONS:    Current Outpatient Medications   Medication Sig Dispense Refill    Calcium Citrate-Vitamin D (CITRACAL + D OR) Take 2 tablets by mouth 2 (two) times  daily.        levetiracetam 750 MG Oral Tab Take 1 tablet (750 mg total) by mouth 2 (two) times daily. 180 tablet 3    Cholecalciferol (VITAMIN D) 2000 units Oral Cap Take 1 capsule (2,000 Units total) by mouth daily. (Patient taking differently: Take 1 capsule (2,000 Units total) by mouth in the morning. Pt currently takes only 4 days a week.) 30 capsule 0       ALLERGIES:  Allergies   Allergen Reactions    Dander      Pet      Seasonal Runny nose       SOCIAL HISTORY:    Social History     Socioeconomic History    Marital status: Single   Occupational History    Occupation: teacher   Tobacco Use    Smoking status: Never    Smokeless tobacco: Never    Tobacco comments:     Never smoked   Vaping Use    Vaping status: Never Used   Substance and Sexual Activity    Alcohol use: Yes     Comment: 1 beer or wine on holidays    Drug use: No   Other Topics Concern    Caffeine Concern Yes     Comment: 1  or 2 cups of tea or soda daily    Stress Concern No    Weight Concern No    Special Diet No    Exercise Yes     Comment: walking    Seat Belt Yes     Comment: always wear seatbelt       FAMILY HISTORY:   Family History   Problem Relation Age of Onset    Hypertension Father         Pace maker    Heart Attack Father     Hypertension Mother     Thyroid disease Mother         Hypothyroidism    Other (Other) Mother         polycythemia rubra vera    Thyroid disease Sister     Other (Alopecia) Sister     Breast Cancer Paternal Aunt         79       ASSESSMENTS:     REVIEW OF SYSTEMS:  Constitutional: Negative for: weight change, fever, fatigue, cold/heat intolerance  Eyes: Negative for:  Visual changes, proptosis, blurring  ENT: Negative for:  dysphagia, neck swelling, dysphonia  Respiratory: Negative for:  dyspnea, cough  Cardiovascular: Negative for:  chest pain, palpitations, orthopnea  GI: Negative for:  abdominal pain, nausea, vomiting, diarrhea, constipation, bleeding  Neurology: Negative for: headache, numbness,  weakness  Genito-Urinary: Negative for: dysuria, frequency  Psychiatric: Negative for:  depression, anxiety  Hematology/Lymphatics: Negative for: bruising, lower extremity edema  Endocrine: Negative for: polyuria, polydypsia  Skin: Negative for: rash, blister, cellulitis,      PHYSICAL EXAM:   Vitals:    06/30/25 1332   BP: 110/69   Pulse: 106   Weight: 103 lb (46.7 kg)   Height: 5' (1.524 m)       BMI: Body mass index is 20.12 kg/m².         General Appearance:  alert, well developed, in no acute distress  Head: Atraumatic  Eyes:  normal conjunctivae, sclera., normal sclera and normal pupils  Throat/Neck: normal sound to voice. Normal hearing, normal speech  Respiratory:  Speaking in full sentences, non-labored. no increased work of breathing, no audible wheezing    Neuro: motor grossly intact, moving all extremities without difficulty  Psychiatric:  oriented to time, self, and place        DATA:     Pertinent data reviewed      DXA 2023:   L spine - 2.7 Change - 2.9 %   Total hip - 2.3  Change - 4.3 %   FN - 2.4 Change 2.2 %     ASSESSMENT AND PLAN:      Patient is a 59 year old female with Osteoporosis      I  reviewed the following:   - Osteoporosis: pathogenesis and management  - Various agents for Osteoporosis management including anabolic and anti resorptive therapy discussed in a detail along with pros and cons , SE and CI    She has worsening BMD On DXA, switched to prolia in dec 2023    Doing well  4th injection today  Vit D to 2000 units for 4 days a week       Prolia reviewed  Contraindications  Hypocalcemia   Pregnancy  Known hypersensitivity to Prolia    Warnings  Hypersensitivity including anaphylactic reactions may occur.   Hypocalcemia: Must be corrected before initiating Prolia. May worsen,   especially in patients with renal impairment. Adequately supplement   patients with calcium and vitamin D.  Osteonecrosis of the jaw: Has been reported with Prolia  Atypical femoral fractures: Have been  reported.  Contact the clinic if you develop new thigh or   groin pain to rule out an incomplete femoral fracture.  Multiple vertebral fractures have been reported following Prolia   discontinuation. Patients should be transitioned to another antiresorptive   agent if Prolia is discontinued.   Serious infections including skin infections: May occur, including those   leading to hospitalization. Patients should seek prompt medical   attention if they develop signs or symptoms of infection, including   cellulitis.  Dermatologic reactions: Dermatitis, rashes, and eczema have been   reported  Severe bone, joint, muscle pain may occur  Suppression of bone turnover: Significant suppression has been   Demonstrated    Adverse reactions  Most common adverse reactions (> 5%   and more common than placebo) were: back pain, pain in extremity,  hypercholesterolemia, musculoskeletal pain, and cystitis        Diet: Eat foods with high calcium: milk with vitamin D added, fish from the ocean, yogurt, green leafy vegetables  Exercise:  lifelong physical activity at all ages is strongly endorsed by the National Osteoporosis Foundation. Exercise recommendations generally should include weight-bearing, muscle-strengthening, and balance training exercises for 30 minutes 5 days per week or 75 minutes twice weekly, often consistent with other general health recommendations. Weight-bearing exercises are activities that make the body move against gravity such as walking, jogging, dancing, tennis, and Javed Chi. To protect the spine in patients with low spinal bone density, maintaining a straight spine and avoiding arching and twisting are generally recommended.     Fall precautions reviewed       She will FU with me 6 months     Orders Placed This Encounter   Procedures    Alk Phosphatase, Bone Specific    PTH Intact with minerals    Vitamin D [E]         Claudia Rodríguez MD        Patient verbalized a complete  understanding of all of the  above and did not have any further questions.

## 2025-07-01 ENCOUNTER — PATIENT MESSAGE (OUTPATIENT)
Dept: ENDOCRINOLOGY CLINIC | Facility: CLINIC | Age: 60
End: 2025-07-01

## 2025-07-01 NOTE — TELEPHONE ENCOUNTER
6.30.25    ASSESSMENT AND PLAN:        Patient is a 59 year old female with Osteoporosis        I  reviewed the following:   - Osteoporosis: pathogenesis and management  - Various agents for Osteoporosis management including anabolic and anti resorptive therapy discussed in a detail along with pros and cons , SE and CI     She has worsening BMD On DXA, switched to prolia in dec 2023     Doing well  4th injection today  Vit D to 2000 units for 4 days a week         Prolia reviewed  Contraindications  Hypocalcemia   Pregnancy  Known hypersensitivity to Prolia     Warnings  Hypersensitivity including anaphylactic reactions may occur.   Hypocalcemia: Must be corrected before initiating Prolia. May worsen,   especially in patients with renal impairment. Adequately supplement   patients with calcium and vitamin D.  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  Contact the clinic if you develop new thigh or   groin pain to rule out an incomplete femoral fracture.  Multiple vertebral fractures have been reported following Prolia   discontinuation. Patients should be transitioned to another antiresorptive   agent if Prolia is discontinued.   Serious infections including skin infections: May occur, including those   leading to hospitalization. Patients should seek prompt medical   attention if they develop signs or symptoms of infection, including   cellulitis.  Dermatologic reactions: Dermatitis, rashes, and eczema have been   reported  Severe bone, joint, muscle pain may occur  Suppression of bone turnover: Significant suppression has been   Demonstrated     Adverse reactions  Most common adverse reactions (> 5%   and more common than placebo) were: back pain, pain in extremity,  hypercholesterolemia, musculoskeletal pain, and cystitis           Diet: Eat foods with high calcium: milk with vitamin D added, fish from the ocean, yogurt, green leafy  vegetables  Exercise:  lifelong physical activity at all ages is strongly endorsed by the [employer]. Exercise recommendations generally should include weight-bearing, muscle-strengthening, and balance training exercises for 30 minutes 5 days per week or 75 minutes twice weekly, often consistent with other general health recommendations. Weight-bearing exercises are activities that make the body move against gravity such as walking, jogging, dancing, tennis, and Javed Chi. To protect the spine in patients with low spinal bone density, maintaining a straight spine and avoiding arching and twisting are generally recommended.      Fall precautions reviewed        She will FU with me 6 months          Orders Placed This Encounter   Procedures    Alk Phosphatase, Bone Specific    PTH Intact with minerals    Vitamin D [E]            Claudia Rodríguez MD

## 2025-07-16 ENCOUNTER — HOSPITAL ENCOUNTER (OUTPATIENT)
Dept: MAMMOGRAPHY | Age: 60
Discharge: HOME | End: 2025-07-16
Attending: INTERNAL MEDICINE
Payer: COMMERCIAL

## 2025-07-16 ENCOUNTER — HOSPITAL ENCOUNTER (OUTPATIENT)
Dept: BONE DENSITY | Age: 60
Discharge: HOME | End: 2025-07-16
Attending: INTERNAL MEDICINE
Payer: COMMERCIAL

## 2025-07-16 DIAGNOSIS — Z12.31 ENCOUNTER FOR SCREENING MAMMOGRAM FOR MALIGNANT NEOPLASM OF BREAST: ICD-10-CM

## 2025-07-16 DIAGNOSIS — Z78.0 POSTMENOPAUSAL: ICD-10-CM

## 2025-07-16 PROCEDURE — 77063 BREAST TOMOSYNTHESIS BI: CPT | Performed by: INTERNAL MEDICINE

## 2025-07-16 PROCEDURE — 77080 DXA BONE DENSITY AXIAL: CPT | Performed by: INTERNAL MEDICINE

## 2025-07-16 PROCEDURE — 77067 SCR MAMMO BI INCL CAD: CPT | Performed by: INTERNAL MEDICINE

## 2025-07-17 ENCOUNTER — PATIENT MESSAGE (OUTPATIENT)
Dept: INTERNAL MEDICINE CLINIC | Facility: CLINIC | Age: 60
End: 2025-07-17

## 2025-08-08 ENCOUNTER — HOSPITAL ENCOUNTER (OUTPATIENT)
Dept: MAMMOGRAPHY | Facility: HOSPITAL | Age: 60
Discharge: HOME | End: 2025-08-08
Attending: INTERNAL MEDICINE

## 2025-08-08 DIAGNOSIS — R92.2 INCONCLUSIVE MAMMOGRAM: ICD-10-CM

## 2025-08-08 PROCEDURE — 77061 BREAST TOMOSYNTHESIS UNI: CPT | Performed by: INTERNAL MEDICINE

## 2025-08-08 PROCEDURE — 76642 ULTRASOUND BREAST LIMITED: CPT | Performed by: INTERNAL MEDICINE

## 2025-08-08 PROCEDURE — 77065 DX MAMMO INCL CAD UNI: CPT | Performed by: INTERNAL MEDICINE

## (undated) NOTE — MR AVS SNAPSHOT
[facility] [address] [facility] [id number] [phone]               Thank you for choosing us for your health care visit with Breezy Alexander MD.  We are glad to serve you and happy to provide you with this summary of your v Levetiracetam, S    Complete by:  Jun 08, 2017 (Approximate)    Assoc Dx:  Partial symptomatic epilepsy with complex partial seizures, not intractable, without status epilepticus ([facility] [G40.209], Neurofibromatosis ([facility] [Q85.00], Thyroid nodule [E04.1] Scheduling Tests    If your physician has ordered radiology tests such as MRI or CT scans, do not schedule the test until this office has notified you that the test has been approved by your insurer.   Depending on your insurance carrier, approval may take [email or web address]. If you've recently had a stay at the Hospital you can access your discharge instructions in [facility] by going to Visits < Admission Summaries.  If you've been to the Emergency Department or your doctor's office, you can view yo

## (undated) NOTE — LETTER
Date: 7/21/2017    Patient Name: Mayra Whitley          To Whom it may concern: This letter has been written at the patient's request. The above patient was seen at the [facility] for treatment of a medical condition.     I have found her